# Patient Record
Sex: MALE | Race: WHITE | Employment: OTHER | URBAN - METROPOLITAN AREA
[De-identification: names, ages, dates, MRNs, and addresses within clinical notes are randomized per-mention and may not be internally consistent; named-entity substitution may affect disease eponyms.]

---

## 2018-02-22 PROBLEM — A41.9 SEPSIS (HCC): Status: ACTIVE | Noted: 2018-02-22

## 2018-02-23 PROBLEM — F44.89 CONFUSION STATE: Status: ACTIVE | Noted: 2018-02-23

## 2018-02-23 PROBLEM — J10.1 INFLUENZA A: Status: ACTIVE | Noted: 2018-02-23

## 2018-02-23 PROBLEM — E11.9 TYPE 2 DIABETES MELLITUS WITHOUT COMPLICATION (HCC): Chronic | Status: ACTIVE | Noted: 2018-02-23

## 2018-04-11 PROBLEM — J10.1 INFLUENZA A: Status: RESOLVED | Noted: 2018-02-23 | Resolved: 2018-04-11

## 2019-01-01 ENCOUNTER — HOSPITAL ENCOUNTER (OUTPATIENT)
Age: 84
Discharge: HOME | End: 2019-10-12
Payer: COMMERCIAL

## 2019-01-01 LAB
ANION GAP SERPL CALCULATED.3IONS-SCNC: 15 MMOL/L (ref 7–16)
BUN BLDV-MCNC: 16 MG/DL (ref 8–23)
CALCIUM SERPL-MCNC: 9.5 MG/DL (ref 8.6–10.2)
CHLORIDE BLD-SCNC: 107 MMOL/L (ref 98–107)
CO2: 19 MMOL/L (ref 22–29)
CREAT SERPL-MCNC: 0.9 MG/DL (ref 0.7–1.2)
GFR AFRICAN AMERICAN: >60
GFR NON-AFRICAN AMERICAN: >60 ML/MIN/1.73
GLUCOSE BLD-MCNC: 103 MG/DL (ref 74–99)
HBA1C MFR BLD: 6 % (ref 4–5.6)
POTASSIUM SERPL-SCNC: 5.1 MMOL/L (ref 3.5–5)
SODIUM BLD-SCNC: 141 MMOL/L (ref 132–146)

## 2019-01-01 PROCEDURE — 83036 HEMOGLOBIN GLYCOSYLATED A1C: CPT

## 2019-01-01 PROCEDURE — 80048 BASIC METABOLIC PNL TOTAL CA: CPT

## 2019-01-01 PROCEDURE — 36415 COLL VENOUS BLD VENIPUNCTURE: CPT

## 2020-01-01 ENCOUNTER — APPOINTMENT (OUTPATIENT)
Dept: CT IMAGING | Age: 85
DRG: 177 | End: 2020-01-01
Payer: MEDICARE

## 2020-01-01 ENCOUNTER — HOSPITAL ENCOUNTER (OUTPATIENT)
Age: 85
Discharge: HOME | End: 2020-04-20

## 2020-01-01 ENCOUNTER — HOSPITAL ENCOUNTER (OUTPATIENT)
Age: 85
Discharge: HOME | End: 2020-01-16
Payer: COMMERCIAL

## 2020-01-01 ENCOUNTER — TELEPHONE (OUTPATIENT)
Dept: FAMILY MEDICINE CLINIC | Age: 85
End: 2020-01-01

## 2020-01-01 ENCOUNTER — HOSPITAL ENCOUNTER (INPATIENT)
Age: 85
LOS: 19 days | Discharge: HOSPICE | DRG: 177 | End: 2020-04-18
Attending: EMERGENCY MEDICINE | Admitting: INTERNAL MEDICINE
Payer: MEDICARE

## 2020-01-01 VITALS
OXYGEN SATURATION: 93 % | HEIGHT: 70 IN | BODY MASS INDEX: 23.78 KG/M2 | TEMPERATURE: 98.3 F | RESPIRATION RATE: 20 BRPM | DIASTOLIC BLOOD PRESSURE: 79 MMHG | WEIGHT: 166.1 LBS | SYSTOLIC BLOOD PRESSURE: 151 MMHG | HEART RATE: 78 BPM

## 2020-01-01 LAB
ADENOVIRUS BY PCR: NOT DETECTED
ALBUMIN SERPL-MCNC: 2.6 G/DL (ref 3.5–5.2)
ALBUMIN SERPL-MCNC: 2.7 G/DL (ref 3.5–5.2)
ALBUMIN SERPL-MCNC: 2.7 G/DL (ref 3.5–5.2)
ALBUMIN SERPL-MCNC: 2.8 G/DL (ref 3.5–5.2)
ALBUMIN SERPL-MCNC: 2.9 G/DL (ref 3.5–5.2)
ALBUMIN SERPL-MCNC: 2.9 G/DL (ref 3.5–5.2)
ALBUMIN SERPL-MCNC: 3 G/DL (ref 3.5–5.2)
ALBUMIN SERPL-MCNC: 3 G/DL (ref 3.5–5.2)
ALBUMIN SERPL-MCNC: 3.1 G/DL (ref 3.5–5.2)
ALBUMIN SERPL-MCNC: 3.1 G/DL (ref 3.5–5.2)
ALBUMIN SERPL-MCNC: 3.2 G/DL (ref 3.5–5.2)
ALBUMIN SERPL-MCNC: 3.4 G/DL (ref 3.5–5.2)
ALP BLD-CCNC: 101 U/L (ref 40–129)
ALP BLD-CCNC: 106 U/L (ref 40–129)
ALP BLD-CCNC: 109 U/L (ref 40–129)
ALP BLD-CCNC: 109 U/L (ref 40–129)
ALP BLD-CCNC: 111 U/L (ref 40–129)
ALP BLD-CCNC: 113 U/L (ref 40–129)
ALP BLD-CCNC: 114 U/L (ref 40–129)
ALP BLD-CCNC: 118 U/L (ref 40–129)
ALP BLD-CCNC: 120 U/L (ref 40–129)
ALP BLD-CCNC: 131 U/L (ref 40–129)
ALP BLD-CCNC: 76 U/L (ref 40–129)
ALP BLD-CCNC: 79 U/L (ref 40–129)
ALP BLD-CCNC: 96 U/L (ref 40–129)
ALP BLD-CCNC: 98 U/L (ref 40–129)
ALP BLD-CCNC: 98 U/L (ref 40–129)
ALP BLD-CCNC: 99 U/L (ref 40–129)
ALT SERPL-CCNC: 10 U/L (ref 0–40)
ALT SERPL-CCNC: 11 U/L (ref 0–40)
ALT SERPL-CCNC: 11 U/L (ref 0–40)
ALT SERPL-CCNC: 12 U/L (ref 0–40)
ALT SERPL-CCNC: 14 U/L (ref 0–40)
ALT SERPL-CCNC: 14 U/L (ref 0–40)
ALT SERPL-CCNC: 15 U/L (ref 0–40)
ALT SERPL-CCNC: 15 U/L (ref 0–40)
ALT SERPL-CCNC: 19 U/L (ref 0–40)
ALT SERPL-CCNC: 19 U/L (ref 0–40)
ALT SERPL-CCNC: 29 U/L (ref 0–40)
ALT SERPL-CCNC: 29 U/L (ref 0–40)
ALT SERPL-CCNC: 39 U/L (ref 0–40)
ALT SERPL-CCNC: 7 U/L (ref 0–40)
ALT SERPL-CCNC: 8 U/L (ref 0–40)
ALT SERPL-CCNC: 9 U/L (ref 0–40)
ANION GAP SERPL CALCULATED.3IONS-SCNC: 10 MMOL/L (ref 7–16)
ANION GAP SERPL CALCULATED.3IONS-SCNC: 10 MMOL/L (ref 7–16)
ANION GAP SERPL CALCULATED.3IONS-SCNC: 11 MMOL/L (ref 7–16)
ANION GAP SERPL CALCULATED.3IONS-SCNC: 12 MMOL/L (ref 7–16)
ANION GAP SERPL CALCULATED.3IONS-SCNC: 13 MMOL/L (ref 7–16)
ANION GAP SERPL CALCULATED.3IONS-SCNC: 14 MMOL/L (ref 7–16)
ANION GAP SERPL CALCULATED.3IONS-SCNC: 16 MMOL/L (ref 7–16)
ANION GAP SERPL CALCULATED.3IONS-SCNC: 17 MMOL/L (ref 7–16)
ANION GAP SERPL CALCULATED.3IONS-SCNC: 17 MMOL/L (ref 7–16)
ANION GAP SERPL CALCULATED.3IONS-SCNC: 19 MMOL/L (ref 7–16)
ANION GAP SERPL CALCULATED.3IONS-SCNC: 9 MMOL/L (ref 7–16)
ANISOCYTOSIS: ABNORMAL
ANISOCYTOSIS: ABNORMAL
AST SERPL-CCNC: 109 U/L (ref 0–39)
AST SERPL-CCNC: 137 U/L (ref 0–39)
AST SERPL-CCNC: 17 U/L (ref 0–39)
AST SERPL-CCNC: 18 U/L (ref 0–39)
AST SERPL-CCNC: 18 U/L (ref 0–39)
AST SERPL-CCNC: 19 U/L (ref 0–39)
AST SERPL-CCNC: 19 U/L (ref 0–39)
AST SERPL-CCNC: 23 U/L (ref 0–39)
AST SERPL-CCNC: 24 U/L (ref 0–39)
AST SERPL-CCNC: 26 U/L (ref 0–39)
AST SERPL-CCNC: 27 U/L (ref 0–39)
AST SERPL-CCNC: 31 U/L (ref 0–39)
AST SERPL-CCNC: 35 U/L (ref 0–39)
AST SERPL-CCNC: 38 U/L (ref 0–39)
AST SERPL-CCNC: 43 U/L (ref 0–39)
AST SERPL-CCNC: 51 U/L (ref 0–39)
AST SERPL-CCNC: 67 U/L (ref 0–39)
AST SERPL-CCNC: 70 U/L (ref 0–39)
ATYPICAL LYMPHOCYTE RELATIVE PERCENT: 0.9 % (ref 0–4)
B.E.: 0.9 MMOL/L (ref -3–0)
BASOPHILS ABSOLUTE: 0 E9/L (ref 0–0.2)
BASOPHILS ABSOLUTE: 0.01 E9/L (ref 0–0.2)
BASOPHILS ABSOLUTE: 0.01 E9/L (ref 0–0.2)
BASOPHILS ABSOLUTE: 0.02 E9/L (ref 0–0.2)
BASOPHILS ABSOLUTE: 0.03 E9/L (ref 0–0.2)
BASOPHILS ABSOLUTE: 0.03 E9/L (ref 0–0.2)
BASOPHILS ABSOLUTE: 0.04 E9/L (ref 0–0.2)
BASOPHILS ABSOLUTE: 0.05 E9/L (ref 0–0.2)
BASOPHILS ABSOLUTE: 0.07 E9/L (ref 0–0.2)
BASOPHILS RELATIVE PERCENT: 0 % (ref 0–2)
BASOPHILS RELATIVE PERCENT: 0.2 % (ref 0–2)
BASOPHILS RELATIVE PERCENT: 0.3 % (ref 0–2)
BASOPHILS RELATIVE PERCENT: 0.4 % (ref 0–2)
BASOPHILS RELATIVE PERCENT: 0.5 % (ref 0–2)
BASOPHILS RELATIVE PERCENT: 0.5 % (ref 0–2)
BASOPHILS RELATIVE PERCENT: 0.6 % (ref 0–2)
BILIRUB SERPL-MCNC: 0.5 MG/DL (ref 0–1.2)
BILIRUB SERPL-MCNC: 0.6 MG/DL (ref 0–1.2)
BILIRUB SERPL-MCNC: 0.7 MG/DL (ref 0–1.2)
BILIRUB SERPL-MCNC: 0.7 MG/DL (ref 0–1.2)
BLOOD CULTURE, ROUTINE: NORMAL
BORDETELLA PARAPERTUSSIS BY PCR: NOT DETECTED
BORDETELLA PERTUSSIS BY PCR: NOT DETECTED
BUN BLDV-MCNC: 11 MG/DL (ref 8–23)
BUN BLDV-MCNC: 13 MG/DL (ref 8–23)
BUN BLDV-MCNC: 13 MG/DL (ref 8–23)
BUN BLDV-MCNC: 18 MG/DL (ref 8–23)
BUN BLDV-MCNC: 22 MG/DL (ref 8–23)
BUN BLDV-MCNC: 22 MG/DL (ref 8–23)
BUN BLDV-MCNC: 23 MG/DL (ref 8–23)
BUN BLDV-MCNC: 24 MG/DL (ref 8–23)
BUN BLDV-MCNC: 24 MG/DL (ref 8–23)
BUN BLDV-MCNC: 25 MG/DL (ref 8–23)
BUN BLDV-MCNC: 26 MG/DL (ref 8–23)
BUN BLDV-MCNC: 27 MG/DL (ref 8–23)
BUN BLDV-MCNC: 27 MG/DL (ref 8–23)
BUN BLDV-MCNC: 28 MG/DL (ref 8–23)
BUN BLDV-MCNC: 31 MG/DL (ref 8–23)
BUN BLDV-MCNC: 32 MG/DL (ref 8–23)
BUN BLDV-MCNC: 32 MG/DL (ref 8–23)
BUN BLDV-MCNC: 33 MG/DL (ref 8–23)
BUN BLDV-MCNC: 34 MG/DL (ref 8–23)
BUN BLDV-MCNC: 35 MG/DL (ref 8–23)
BURR CELLS: ABNORMAL
C-REACTIVE PROTEIN: 10.2 MG/DL (ref 0–0.4)
CALCIUM SERPL-MCNC: 8.4 MG/DL (ref 8.6–10.2)
CALCIUM SERPL-MCNC: 8.4 MG/DL (ref 8.6–10.2)
CALCIUM SERPL-MCNC: 8.5 MG/DL (ref 8.6–10.2)
CALCIUM SERPL-MCNC: 8.6 MG/DL (ref 8.6–10.2)
CALCIUM SERPL-MCNC: 8.7 MG/DL (ref 8.6–10.2)
CALCIUM SERPL-MCNC: 8.7 MG/DL (ref 8.6–10.2)
CALCIUM SERPL-MCNC: 8.8 MG/DL (ref 8.6–10.2)
CALCIUM SERPL-MCNC: 8.9 MG/DL (ref 8.6–10.2)
CALCIUM SERPL-MCNC: 9 MG/DL (ref 8.6–10.2)
CALCIUM SERPL-MCNC: 9 MG/DL (ref 8.6–10.2)
CALCIUM SERPL-MCNC: 9.2 MG/DL (ref 8.6–10.2)
CALCIUM SERPL-MCNC: 9.2 MG/DL (ref 8.6–10.2)
CALCIUM SERPL-MCNC: 9.3 MG/DL (ref 8.6–10.2)
CHLAMYDOPHILIA PNEUMONIAE BY PCR: NOT DETECTED
CHLORIDE BLD-SCNC: 101 MMOL/L (ref 98–107)
CHLORIDE BLD-SCNC: 104 MMOL/L (ref 98–107)
CHLORIDE BLD-SCNC: 104 MMOL/L (ref 98–107)
CHLORIDE BLD-SCNC: 105 MMOL/L (ref 98–107)
CHLORIDE BLD-SCNC: 105 MMOL/L (ref 98–107)
CHLORIDE BLD-SCNC: 106 MMOL/L (ref 98–107)
CHLORIDE BLD-SCNC: 107 MMOL/L (ref 98–107)
CHLORIDE BLD-SCNC: 108 MMOL/L (ref 98–107)
CHLORIDE BLD-SCNC: 108 MMOL/L (ref 98–107)
CHLORIDE BLD-SCNC: 109 MMOL/L (ref 98–107)
CHLORIDE BLD-SCNC: 112 MMOL/L (ref 98–107)
CHLORIDE BLD-SCNC: 114 MMOL/L (ref 98–107)
CHLORIDE BLD-SCNC: 114 MMOL/L (ref 98–107)
CHLORIDE BLD-SCNC: 116 MMOL/L (ref 98–107)
CHLORIDE BLD-SCNC: 116 MMOL/L (ref 98–107)
CHLORIDE BLD-SCNC: 118 MMOL/L (ref 98–107)
CHLORIDE BLD-SCNC: 118 MMOL/L (ref 98–107)
CHLORIDE BLD-SCNC: 119 MMOL/L (ref 98–107)
CHLORIDE BLD-SCNC: 119 MMOL/L (ref 98–107)
CHLORIDE URINE RANDOM: 23 MMOL/L
CHOLESTEROL, TOTAL: 105 MG/DL (ref 0–199)
CO2: 20 MMOL/L (ref 22–29)
CO2: 21 MMOL/L (ref 22–29)
CO2: 22 MMOL/L (ref 22–29)
CO2: 23 MMOL/L (ref 22–29)
CO2: 24 MMOL/L (ref 22–29)
CO2: 24 MMOL/L (ref 22–29)
CO2: 25 MMOL/L (ref 22–29)
CO2: 26 MMOL/L (ref 22–29)
CORONAVIRUS 229E BY PCR: NOT DETECTED
CORONAVIRUS HKU1 BY PCR: NOT DETECTED
CORONAVIRUS NL63 BY PCR: NOT DETECTED
CORONAVIRUS OC43 BY PCR: NOT DETECTED
CREAT SERPL-MCNC: 0.8 MG/DL (ref 0.7–1.2)
CREAT SERPL-MCNC: 0.9 MG/DL (ref 0.7–1.2)
CREAT SERPL-MCNC: 1 MG/DL (ref 0.7–1.2)
CREATININE URINE: 166 MG/DL (ref 40–278)
CULTURE, BLOOD 2: NORMAL
DEVICE: ABNORMAL
EKG ATRIAL RATE: 78 BPM
EKG P AXIS: 44 DEGREES
EKG P-R INTERVAL: 174 MS
EKG Q-T INTERVAL: 372 MS
EKG QRS DURATION: 80 MS
EKG QTC CALCULATION (BAZETT): 424 MS
EKG R AXIS: 43 DEGREES
EKG T AXIS: 52 DEGREES
EKG VENTRICULAR RATE: 78 BPM
EOSINOPHILS ABSOLUTE: 0 E9/L (ref 0.05–0.5)
EOSINOPHILS ABSOLUTE: 0.02 E9/L (ref 0.05–0.5)
EOSINOPHILS ABSOLUTE: 0.03 E9/L (ref 0.05–0.5)
EOSINOPHILS ABSOLUTE: 0.03 E9/L (ref 0.05–0.5)
EOSINOPHILS ABSOLUTE: 0.04 E9/L (ref 0.05–0.5)
EOSINOPHILS ABSOLUTE: 0.04 E9/L (ref 0.05–0.5)
EOSINOPHILS ABSOLUTE: 0.05 E9/L (ref 0.05–0.5)
EOSINOPHILS ABSOLUTE: 0.06 E9/L (ref 0.05–0.5)
EOSINOPHILS ABSOLUTE: 0.07 E9/L (ref 0.05–0.5)
EOSINOPHILS ABSOLUTE: 0.11 E9/L (ref 0.05–0.5)
EOSINOPHILS RELATIVE PERCENT: 0 % (ref 0–6)
EOSINOPHILS RELATIVE PERCENT: 0.3 % (ref 0–6)
EOSINOPHILS RELATIVE PERCENT: 0.4 % (ref 0–6)
EOSINOPHILS RELATIVE PERCENT: 0.4 % (ref 0–6)
EOSINOPHILS RELATIVE PERCENT: 0.5 % (ref 0–6)
EOSINOPHILS RELATIVE PERCENT: 0.5 % (ref 0–6)
EOSINOPHILS RELATIVE PERCENT: 0.6 % (ref 0–6)
EOSINOPHILS RELATIVE PERCENT: 0.7 % (ref 0–6)
EOSINOPHILS RELATIVE PERCENT: 0.8 % (ref 0–6)
EOSINOPHILS RELATIVE PERCENT: 0.9 % (ref 0–6)
EOSINOPHILS RELATIVE PERCENT: 1.2 % (ref 0–6)
GFR AFRICAN AMERICAN: >60
GFR NON-AFRICAN AMERICAN: >60 ML/MIN/1.73
GLUCOSE BLD-MCNC: 116 MG/DL (ref 74–99)
GLUCOSE BLD-MCNC: 119 MG/DL (ref 74–99)
GLUCOSE BLD-MCNC: 125 MG/DL (ref 74–99)
GLUCOSE BLD-MCNC: 126 MG/DL (ref 74–99)
GLUCOSE BLD-MCNC: 127 MG/DL (ref 74–99)
GLUCOSE BLD-MCNC: 127 MG/DL (ref 74–99)
GLUCOSE BLD-MCNC: 129 MG/DL (ref 74–99)
GLUCOSE BLD-MCNC: 129 MG/DL (ref 74–99)
GLUCOSE BLD-MCNC: 131 MG/DL (ref 74–99)
GLUCOSE BLD-MCNC: 133 MG/DL (ref 74–99)
GLUCOSE BLD-MCNC: 136 MG/DL (ref 74–99)
GLUCOSE BLD-MCNC: 142 MG/DL (ref 74–99)
GLUCOSE BLD-MCNC: 156 MG/DL (ref 74–99)
GLUCOSE BLD-MCNC: 158 MG/DL (ref 74–99)
GLUCOSE BLD-MCNC: 166 MG/DL (ref 74–99)
GLUCOSE BLD-MCNC: 194 MG/DL (ref 74–99)
GLUCOSE BLD-MCNC: 212 MG/DL (ref 74–99)
GLUCOSE BLD-MCNC: 229 MG/DL (ref 74–99)
GLUCOSE BLD-MCNC: 80 MG/DL (ref 74–99)
GLUCOSE BLD-MCNC: 81 MG/DL (ref 74–99)
GLUCOSE BLD-MCNC: 84 MG/DL (ref 74–99)
GLUCOSE BLD-MCNC: 85 MG/DL (ref 74–99)
GLUCOSE BLD-MCNC: 88 MG/DL (ref 74–99)
GLUCOSE BLD-MCNC: 91 MG/DL (ref 74–99)
GLUCOSE BLD-MCNC: 98 MG/DL (ref 74–99)
HBA1C MFR BLD: 6.1 % (ref 4–5.6)
HCO3 ARTERIAL: 24.4 MMOL/L (ref 22–26)
HCT VFR BLD CALC: 33.5 % (ref 37–54)
HCT VFR BLD CALC: 34.6 % (ref 37–54)
HCT VFR BLD CALC: 34.7 % (ref 37–54)
HCT VFR BLD CALC: 35.4 % (ref 37–54)
HCT VFR BLD CALC: 35.5 % (ref 37–54)
HCT VFR BLD CALC: 35.6 % (ref 37–54)
HCT VFR BLD CALC: 35.8 % (ref 37–54)
HCT VFR BLD CALC: 36.3 % (ref 37–54)
HCT VFR BLD CALC: 36.5 % (ref 37–54)
HCT VFR BLD CALC: 36.5 % (ref 37–54)
HCT VFR BLD CALC: 38.3 % (ref 37–54)
HCT VFR BLD CALC: 38.4 % (ref 37–54)
HCT VFR BLD CALC: 40.2 % (ref 37–54)
HCT VFR BLD CALC: 41 % (ref 37–54)
HCT VFR BLD CALC: 42 % (ref 37–54)
HCT VFR BLD CALC: 42.4 % (ref 37–54)
HCT VFR BLD CALC: 42.7 % (ref 37–54)
HCT VFR BLD CALC: 42.9 % (ref 37–54)
HCT VFR BLD CALC: 43.4 % (ref 37–54)
HDLC SERPL-MCNC: 43 MG/DL
HEMOGLOBIN: 10.7 G/DL (ref 12.5–16.5)
HEMOGLOBIN: 11.2 G/DL (ref 12.5–16.5)
HEMOGLOBIN: 11.4 G/DL (ref 12.5–16.5)
HEMOGLOBIN: 11.6 G/DL (ref 12.5–16.5)
HEMOGLOBIN: 11.7 G/DL (ref 12.5–16.5)
HEMOGLOBIN: 11.8 G/DL (ref 12.5–16.5)
HEMOGLOBIN: 11.9 G/DL (ref 12.5–16.5)
HEMOGLOBIN: 11.9 G/DL (ref 12.5–16.5)
HEMOGLOBIN: 12.3 G/DL (ref 12.5–16.5)
HEMOGLOBIN: 12.6 G/DL (ref 12.5–16.5)
HEMOGLOBIN: 13.1 G/DL (ref 12.5–16.5)
HEMOGLOBIN: 13.3 G/DL (ref 12.5–16.5)
HEMOGLOBIN: 13.5 G/DL (ref 12.5–16.5)
HEMOGLOBIN: 13.6 G/DL (ref 12.5–16.5)
HEMOGLOBIN: 13.7 G/DL (ref 12.5–16.5)
HEMOGLOBIN: 13.9 G/DL (ref 12.5–16.5)
HEMOGLOBIN: 14 G/DL (ref 12.5–16.5)
HUMAN METAPNEUMOVIRUS BY PCR: NOT DETECTED
HUMAN RHINOVIRUS/ENTEROVIRUS BY PCR: NOT DETECTED
IMMATURE GRANULOCYTES #: 0.03 E9/L
IMMATURE GRANULOCYTES #: 0.08 E9/L
IMMATURE GRANULOCYTES #: 0.09 E9/L
IMMATURE GRANULOCYTES #: 0.14 E9/L
IMMATURE GRANULOCYTES #: 0.15 E9/L
IMMATURE GRANULOCYTES #: 0.18 E9/L
IMMATURE GRANULOCYTES #: 0.19 E9/L
IMMATURE GRANULOCYTES #: 0.21 E9/L
IMMATURE GRANULOCYTES #: 0.31 E9/L
IMMATURE GRANULOCYTES #: 0.39 E9/L
IMMATURE GRANULOCYTES #: 0.4 E9/L
IMMATURE GRANULOCYTES #: 0.42 E9/L
IMMATURE GRANULOCYTES #: 0.45 E9/L
IMMATURE GRANULOCYTES #: 0.51 E9/L
IMMATURE GRANULOCYTES %: 0.5 % (ref 0–5)
IMMATURE GRANULOCYTES %: 0.9 % (ref 0–5)
IMMATURE GRANULOCYTES %: 1.8 % (ref 0–5)
IMMATURE GRANULOCYTES %: 1.8 % (ref 0–5)
IMMATURE GRANULOCYTES %: 1.9 % (ref 0–5)
IMMATURE GRANULOCYTES %: 2 % (ref 0–5)
IMMATURE GRANULOCYTES %: 2.4 % (ref 0–5)
IMMATURE GRANULOCYTES %: 2.4 % (ref 0–5)
IMMATURE GRANULOCYTES %: 3.9 % (ref 0–5)
IMMATURE GRANULOCYTES %: 4 % (ref 0–5)
IMMATURE GRANULOCYTES %: 4.3 % (ref 0–5)
IMMATURE GRANULOCYTES %: 4.4 % (ref 0–5)
IMMATURE GRANULOCYTES %: 4.4 % (ref 0–5)
IMMATURE GRANULOCYTES %: 4.9 % (ref 0–5)
INFLUENZA A BY PCR: NOT DETECTED
INFLUENZA A BY PCR: NOT DETECTED
INFLUENZA B BY PCR: NOT DETECTED
INFLUENZA B BY PCR: NOT DETECTED
LACTIC ACID: 2 MMOL/L (ref 0.5–2.2)
LDL CHOLESTEROL CALCULATED: 46 MG/DL (ref 0–99)
LYMPHOCYTES ABSOLUTE: 0.23 E9/L (ref 1.5–4)
LYMPHOCYTES ABSOLUTE: 0.24 E9/L (ref 1.5–4)
LYMPHOCYTES ABSOLUTE: 0.47 E9/L (ref 1.5–4)
LYMPHOCYTES ABSOLUTE: 0.64 E9/L (ref 1.5–4)
LYMPHOCYTES ABSOLUTE: 0.92 E9/L (ref 1.5–4)
LYMPHOCYTES ABSOLUTE: 0.95 E9/L (ref 1.5–4)
LYMPHOCYTES ABSOLUTE: 1.03 E9/L (ref 1.5–4)
LYMPHOCYTES ABSOLUTE: 1.15 E9/L (ref 1.5–4)
LYMPHOCYTES ABSOLUTE: 1.19 E9/L (ref 1.5–4)
LYMPHOCYTES ABSOLUTE: 1.3 E9/L (ref 1.5–4)
LYMPHOCYTES ABSOLUTE: 1.65 E9/L (ref 1.5–4)
LYMPHOCYTES ABSOLUTE: 1.66 E9/L (ref 1.5–4)
LYMPHOCYTES ABSOLUTE: 1.67 E9/L (ref 1.5–4)
LYMPHOCYTES ABSOLUTE: 2.03 E9/L (ref 1.5–4)
LYMPHOCYTES ABSOLUTE: 2.05 E9/L (ref 1.5–4)
LYMPHOCYTES ABSOLUTE: 2.06 E9/L (ref 1.5–4)
LYMPHOCYTES ABSOLUTE: 2.15 E9/L (ref 1.5–4)
LYMPHOCYTES ABSOLUTE: 2.18 E9/L (ref 1.5–4)
LYMPHOCYTES RELATIVE PERCENT: 1.7 % (ref 20–42)
LYMPHOCYTES RELATIVE PERCENT: 11 % (ref 20–42)
LYMPHOCYTES RELATIVE PERCENT: 12.3 % (ref 20–42)
LYMPHOCYTES RELATIVE PERCENT: 12.9 % (ref 20–42)
LYMPHOCYTES RELATIVE PERCENT: 13.2 % (ref 20–42)
LYMPHOCYTES RELATIVE PERCENT: 13.9 % (ref 20–42)
LYMPHOCYTES RELATIVE PERCENT: 15.5 % (ref 20–42)
LYMPHOCYTES RELATIVE PERCENT: 15.8 % (ref 20–42)
LYMPHOCYTES RELATIVE PERCENT: 16.4 % (ref 20–42)
LYMPHOCYTES RELATIVE PERCENT: 17.5 % (ref 20–42)
LYMPHOCYTES RELATIVE PERCENT: 21.6 % (ref 20–42)
LYMPHOCYTES RELATIVE PERCENT: 21.7 % (ref 20–42)
LYMPHOCYTES RELATIVE PERCENT: 22.7 % (ref 20–42)
LYMPHOCYTES RELATIVE PERCENT: 24.1 % (ref 20–42)
LYMPHOCYTES RELATIVE PERCENT: 27.5 % (ref 20–42)
LYMPHOCYTES RELATIVE PERCENT: 27.7 % (ref 20–42)
LYMPHOCYTES RELATIVE PERCENT: 3.5 % (ref 20–42)
LYMPHOCYTES RELATIVE PERCENT: 6 % (ref 20–42)
MAGNESIUM: 1.7 MG/DL (ref 1.6–2.6)
MAGNESIUM: 1.8 MG/DL (ref 1.6–2.6)
MAGNESIUM: 1.9 MG/DL (ref 1.6–2.6)
MAGNESIUM: 2 MG/DL (ref 1.6–2.6)
MAGNESIUM: 2.1 MG/DL (ref 1.6–2.6)
MAGNESIUM: 2.2 MG/DL (ref 1.6–2.6)
MCH RBC QN AUTO: 29.6 PG (ref 26–35)
MCH RBC QN AUTO: 29.7 PG (ref 26–35)
MCH RBC QN AUTO: 29.8 PG (ref 26–35)
MCH RBC QN AUTO: 30 PG (ref 26–35)
MCH RBC QN AUTO: 30.1 PG (ref 26–35)
MCH RBC QN AUTO: 30.2 PG (ref 26–35)
MCH RBC QN AUTO: 30.2 PG (ref 26–35)
MCH RBC QN AUTO: 30.3 PG (ref 26–35)
MCH RBC QN AUTO: 30.6 PG (ref 26–35)
MCH RBC QN AUTO: 31 PG (ref 26–35)
MCHC RBC AUTO-ENTMCNC: 31.5 % (ref 32–34.5)
MCHC RBC AUTO-ENTMCNC: 31.8 % (ref 32–34.5)
MCHC RBC AUTO-ENTMCNC: 31.9 % (ref 32–34.5)
MCHC RBC AUTO-ENTMCNC: 31.9 % (ref 32–34.5)
MCHC RBC AUTO-ENTMCNC: 32 % (ref 32–34.5)
MCHC RBC AUTO-ENTMCNC: 32.1 % (ref 32–34.5)
MCHC RBC AUTO-ENTMCNC: 32.1 % (ref 32–34.5)
MCHC RBC AUTO-ENTMCNC: 32.3 % (ref 32–34.5)
MCHC RBC AUTO-ENTMCNC: 32.4 % (ref 32–34.5)
MCHC RBC AUTO-ENTMCNC: 32.6 % (ref 32–34.5)
MCHC RBC AUTO-ENTMCNC: 32.8 % (ref 32–34.5)
MCHC RBC AUTO-ENTMCNC: 32.9 % (ref 32–34.5)
MCHC RBC AUTO-ENTMCNC: 33.3 % (ref 32–34.5)
MCV RBC AUTO: 91.3 FL (ref 80–99.9)
MCV RBC AUTO: 91.7 FL (ref 80–99.9)
MCV RBC AUTO: 92 FL (ref 80–99.9)
MCV RBC AUTO: 92.1 FL (ref 80–99.9)
MCV RBC AUTO: 92.1 FL (ref 80–99.9)
MCV RBC AUTO: 92.2 FL (ref 80–99.9)
MCV RBC AUTO: 92.4 FL (ref 80–99.9)
MCV RBC AUTO: 92.6 FL (ref 80–99.9)
MCV RBC AUTO: 92.7 FL (ref 80–99.9)
MCV RBC AUTO: 92.8 FL (ref 80–99.9)
MCV RBC AUTO: 92.8 FL (ref 80–99.9)
MCV RBC AUTO: 93 FL (ref 80–99.9)
MCV RBC AUTO: 93.3 FL (ref 80–99.9)
MCV RBC AUTO: 93.8 FL (ref 80–99.9)
MCV RBC AUTO: 94.1 FL (ref 80–99.9)
MCV RBC AUTO: 94.1 FL (ref 80–99.9)
MCV RBC AUTO: 94.2 FL (ref 80–99.9)
MCV RBC AUTO: 94.4 FL (ref 80–99.9)
MCV RBC AUTO: 95.1 FL (ref 80–99.9)
METAMYELOCYTES RELATIVE PERCENT: 0.9 % (ref 0–1)
METAMYELOCYTES RELATIVE PERCENT: 1 % (ref 0–1)
METAMYELOCYTES RELATIVE PERCENT: 1 % (ref 0–1)
METER GLUCOSE: 105 MG/DL (ref 74–99)
METER GLUCOSE: 108 MG/DL (ref 74–99)
METER GLUCOSE: 114 MG/DL (ref 74–99)
METER GLUCOSE: 114 MG/DL (ref 74–99)
METER GLUCOSE: 128 MG/DL (ref 74–99)
METER GLUCOSE: 81 MG/DL (ref 74–99)
METER GLUCOSE: 94 MG/DL (ref 74–99)
METER GLUCOSE: 95 MG/DL (ref 74–99)
MICROALBUMIN UR-MCNC: <12 MG/L
MICROALBUMIN/CREAT UR-RTO: ABNORMAL (ref 0–30)
MODE: ABNORMAL
MONOCYTES ABSOLUTE: 0.08 E9/L (ref 0.1–0.95)
MONOCYTES ABSOLUTE: 0.29 E9/L (ref 0.1–0.95)
MONOCYTES ABSOLUTE: 0.31 E9/L (ref 0.1–0.95)
MONOCYTES ABSOLUTE: 0.37 E9/L (ref 0.1–0.95)
MONOCYTES ABSOLUTE: 0.4 E9/L (ref 0.1–0.95)
MONOCYTES ABSOLUTE: 0.46 E9/L (ref 0.1–0.95)
MONOCYTES ABSOLUTE: 0.52 E9/L (ref 0.1–0.95)
MONOCYTES ABSOLUTE: 0.54 E9/L (ref 0.1–0.95)
MONOCYTES ABSOLUTE: 0.54 E9/L (ref 0.1–0.95)
MONOCYTES ABSOLUTE: 0.55 E9/L (ref 0.1–0.95)
MONOCYTES ABSOLUTE: 0.55 E9/L (ref 0.1–0.95)
MONOCYTES ABSOLUTE: 0.61 E9/L (ref 0.1–0.95)
MONOCYTES ABSOLUTE: 0.62 E9/L (ref 0.1–0.95)
MONOCYTES ABSOLUTE: 0.66 E9/L (ref 0.1–0.95)
MONOCYTES ABSOLUTE: 0.66 E9/L (ref 0.1–0.95)
MONOCYTES ABSOLUTE: 0.7 E9/L (ref 0.1–0.95)
MONOCYTES ABSOLUTE: 0.7 E9/L (ref 0.1–0.95)
MONOCYTES ABSOLUTE: 0.92 E9/L (ref 0.1–0.95)
MONOCYTES RELATIVE PERCENT: 1 % (ref 2–12)
MONOCYTES RELATIVE PERCENT: 4.7 % (ref 2–12)
MONOCYTES RELATIVE PERCENT: 5.2 % (ref 2–12)
MONOCYTES RELATIVE PERCENT: 5.4 % (ref 2–12)
MONOCYTES RELATIVE PERCENT: 5.5 % (ref 2–12)
MONOCYTES RELATIVE PERCENT: 5.8 % (ref 2–12)
MONOCYTES RELATIVE PERCENT: 6.4 % (ref 2–12)
MONOCYTES RELATIVE PERCENT: 6.5 % (ref 2–12)
MONOCYTES RELATIVE PERCENT: 6.9 % (ref 2–12)
MONOCYTES RELATIVE PERCENT: 7 % (ref 2–12)
MONOCYTES RELATIVE PERCENT: 7.3 % (ref 2–12)
MONOCYTES RELATIVE PERCENT: 7.4 % (ref 2–12)
MONOCYTES RELATIVE PERCENT: 7.8 % (ref 2–12)
MONOCYTES RELATIVE PERCENT: 8 % (ref 2–12)
MONOCYTES RELATIVE PERCENT: 8.7 % (ref 2–12)
MONOCYTES RELATIVE PERCENT: 8.7 % (ref 2–12)
MYCOPLASMA PNEUMONIAE BY PCR: NOT DETECTED
MYELOCYTE PERCENT: 0.9 % (ref 0–0)
MYELOCYTE PERCENT: 0.9 % (ref 0–0)
MYELOCYTE PERCENT: 4 % (ref 0–0)
NEUTROPHILS ABSOLUTE: 2 E9/L (ref 1.8–7.3)
NEUTROPHILS ABSOLUTE: 3.1 E9/L (ref 1.8–7.3)
NEUTROPHILS ABSOLUTE: 4.7 E9/L (ref 1.8–7.3)
NEUTROPHILS ABSOLUTE: 4.71 E9/L (ref 1.8–7.3)
NEUTROPHILS ABSOLUTE: 4.87 E9/L (ref 1.8–7.3)
NEUTROPHILS ABSOLUTE: 5.37 E9/L (ref 1.8–7.3)
NEUTROPHILS ABSOLUTE: 5.63 E9/L (ref 1.8–7.3)
NEUTROPHILS ABSOLUTE: 6.41 E9/L (ref 1.8–7.3)
NEUTROPHILS ABSOLUTE: 6.45 E9/L (ref 1.8–7.3)
NEUTROPHILS ABSOLUTE: 6.62 E9/L (ref 1.8–7.3)
NEUTROPHILS ABSOLUTE: 6.77 E9/L (ref 1.8–7.3)
NEUTROPHILS ABSOLUTE: 6.83 E9/L (ref 1.8–7.3)
NEUTROPHILS ABSOLUTE: 7.17 E9/L (ref 1.8–7.3)
NEUTROPHILS ABSOLUTE: 7.25 E9/L (ref 1.8–7.3)
NEUTROPHILS ABSOLUTE: 7.3 E9/L (ref 1.8–7.3)
NEUTROPHILS ABSOLUTE: 7.56 E9/L (ref 1.8–7.3)
NEUTROPHILS ABSOLUTE: 9.72 E9/L (ref 1.8–7.3)
NEUTROPHILS ABSOLUTE: 9.96 E9/L (ref 1.8–7.3)
NEUTROPHILS RELATIVE PERCENT: 60.3 % (ref 43–80)
NEUTROPHILS RELATIVE PERCENT: 62.4 % (ref 43–80)
NEUTROPHILS RELATIVE PERCENT: 65.2 % (ref 43–80)
NEUTROPHILS RELATIVE PERCENT: 65.7 % (ref 43–80)
NEUTROPHILS RELATIVE PERCENT: 67.8 % (ref 43–80)
NEUTROPHILS RELATIVE PERCENT: 69 % (ref 43–80)
NEUTROPHILS RELATIVE PERCENT: 72 % (ref 43–80)
NEUTROPHILS RELATIVE PERCENT: 72 % (ref 43–80)
NEUTROPHILS RELATIVE PERCENT: 74.3 % (ref 43–80)
NEUTROPHILS RELATIVE PERCENT: 75.1 % (ref 43–80)
NEUTROPHILS RELATIVE PERCENT: 76 % (ref 43–80)
NEUTROPHILS RELATIVE PERCENT: 76.5 % (ref 43–80)
NEUTROPHILS RELATIVE PERCENT: 76.8 % (ref 43–80)
NEUTROPHILS RELATIVE PERCENT: 76.9 % (ref 43–80)
NEUTROPHILS RELATIVE PERCENT: 78.3 % (ref 43–80)
NEUTROPHILS RELATIVE PERCENT: 86.9 % (ref 43–80)
NEUTROPHILS RELATIVE PERCENT: 88.7 % (ref 43–80)
NEUTROPHILS RELATIVE PERCENT: 92 % (ref 43–80)
NUCLEATED RED BLOOD CELLS: 0 /100 WBC
NUCLEATED RED BLOOD CELLS: 0.9 /100 WBC
O2 SATURATION: 98.8 % (ref 92–98.5)
OPERATOR ID: 1023
OSMOLALITY URINE: 910 MOSM/KG (ref 300–900)
OVALOCYTES: ABNORMAL
PARAINFLUENZA VIRUS 1 BY PCR: NOT DETECTED
PARAINFLUENZA VIRUS 2 BY PCR: NOT DETECTED
PARAINFLUENZA VIRUS 3 BY PCR: NOT DETECTED
PARAINFLUENZA VIRUS 4 BY PCR: NOT DETECTED
PATIENT TEMP: 37
PCO2 (TEMP CORRECTED): 34.1 MMHG (ref 35–45)
PDW BLD-RTO: 14.2 FL (ref 11.5–15)
PDW BLD-RTO: 14.3 FL (ref 11.5–15)
PDW BLD-RTO: 14.4 FL (ref 11.5–15)
PDW BLD-RTO: 14.4 FL (ref 11.5–15)
PDW BLD-RTO: 14.5 FL (ref 11.5–15)
PDW BLD-RTO: 14.6 FL (ref 11.5–15)
PDW BLD-RTO: 14.7 FL (ref 11.5–15)
PDW BLD-RTO: 14.7 FL (ref 11.5–15)
PDW BLD-RTO: 14.8 FL (ref 11.5–15)
PH (TEMPERATURE CORRECTED): 7.46 (ref 7.35–7.45)
PHOSPHORUS: 2 MG/DL (ref 2.5–4.5)
PHOSPHORUS: 2.3 MG/DL (ref 2.5–4.5)
PHOSPHORUS: 2.3 MG/DL (ref 2.5–4.5)
PHOSPHORUS: 2.8 MG/DL (ref 2.5–4.5)
PHOSPHORUS: 3 MG/DL (ref 2.5–4.5)
PHOSPHORUS: 3 MG/DL (ref 2.5–4.5)
PHOSPHORUS: 3.1 MG/DL (ref 2.5–4.5)
PLATELET # BLD: 138 E9/L (ref 130–450)
PLATELET # BLD: 150 E9/L (ref 130–450)
PLATELET # BLD: 192 E9/L (ref 130–450)
PLATELET # BLD: 194 E9/L (ref 130–450)
PLATELET # BLD: 201 E9/L (ref 130–450)
PLATELET # BLD: 229 E9/L (ref 130–450)
PLATELET # BLD: 240 E9/L (ref 130–450)
PLATELET # BLD: 244 E9/L (ref 130–450)
PLATELET # BLD: 249 E9/L (ref 130–450)
PLATELET # BLD: 296 E9/L (ref 130–450)
PLATELET # BLD: 297 E9/L (ref 130–450)
PLATELET # BLD: 310 E9/L (ref 130–450)
PLATELET # BLD: 324 E9/L (ref 130–450)
PLATELET # BLD: 337 E9/L (ref 130–450)
PLATELET # BLD: 341 E9/L (ref 130–450)
PLATELET # BLD: 344 E9/L (ref 130–450)
PLATELET # BLD: 349 E9/L (ref 130–450)
PLATELET # BLD: 352 E9/L (ref 130–450)
PLATELET # BLD: 390 E9/L (ref 130–450)
PMV BLD AUTO: 10.1 FL (ref 7–12)
PMV BLD AUTO: 10.1 FL (ref 7–12)
PMV BLD AUTO: 10.2 FL (ref 7–12)
PMV BLD AUTO: 10.3 FL (ref 7–12)
PMV BLD AUTO: 10.4 FL (ref 7–12)
PMV BLD AUTO: 10.4 FL (ref 7–12)
PMV BLD AUTO: 10.8 FL (ref 7–12)
PMV BLD AUTO: 10.8 FL (ref 7–12)
PMV BLD AUTO: 11.2 FL (ref 7–12)
PMV BLD AUTO: 9.6 FL (ref 7–12)
PMV BLD AUTO: 9.7 FL (ref 7–12)
PMV BLD AUTO: 9.7 FL (ref 7–12)
PMV BLD AUTO: 9.8 FL (ref 7–12)
PMV BLD AUTO: 9.8 FL (ref 7–12)
PMV BLD AUTO: 9.9 FL (ref 7–12)
PO2 (TEMP CORRECTED): 115.1 MMHG (ref 60–80)
POIKILOCYTES: ABNORMAL
POLYCHROMASIA: ABNORMAL
POTASSIUM REFLEX MAGNESIUM: 3 MMOL/L (ref 3.5–5)
POTASSIUM REFLEX MAGNESIUM: 3.2 MMOL/L (ref 3.5–5)
POTASSIUM REFLEX MAGNESIUM: 3.5 MMOL/L (ref 3.5–5)
POTASSIUM REFLEX MAGNESIUM: 3.6 MMOL/L (ref 3.5–5)
POTASSIUM REFLEX MAGNESIUM: 3.7 MMOL/L (ref 3.5–5)
POTASSIUM REFLEX MAGNESIUM: 3.8 MMOL/L (ref 3.5–5)
POTASSIUM REFLEX MAGNESIUM: 3.9 MMOL/L (ref 3.5–5)
POTASSIUM REFLEX MAGNESIUM: 3.9 MMOL/L (ref 3.5–5)
POTASSIUM REFLEX MAGNESIUM: 4 MMOL/L (ref 3.5–5)
POTASSIUM REFLEX MAGNESIUM: 4.1 MMOL/L (ref 3.5–5)
POTASSIUM REFLEX MAGNESIUM: 4.2 MMOL/L (ref 3.5–5)
POTASSIUM REFLEX MAGNESIUM: 4.3 MMOL/L (ref 3.5–5)
POTASSIUM REFLEX MAGNESIUM: 4.7 MMOL/L (ref 3.5–5)
POTASSIUM SERPL-SCNC: 3.7 MMOL/L (ref 3.5–5)
POTASSIUM SERPL-SCNC: 3.9 MMOL/L (ref 3.5–5)
POTASSIUM SERPL-SCNC: 4.1 MMOL/L (ref 3.5–5)
POTASSIUM SERPL-SCNC: 4.1 MMOL/L (ref 3.5–5)
POTASSIUM SERPL-SCNC: 4.4 MMOL/L (ref 3.5–5)
POTASSIUM SERPL-SCNC: 4.6 MMOL/L (ref 3.5–5)
POTASSIUM SERPL-SCNC: 4.7 MMOL/L (ref 3.5–5)
PROCALCITONIN: 0.15 NG/ML (ref 0–0.08)
RBC # BLD: 3.57 E12/L (ref 3.8–5.8)
RBC # BLD: 3.72 E12/L (ref 3.8–5.8)
RBC # BLD: 3.76 E12/L (ref 3.8–5.8)
RBC # BLD: 3.77 E12/L (ref 3.8–5.8)
RBC # BLD: 3.8 E12/L (ref 3.8–5.8)
RBC # BLD: 3.84 E12/L (ref 3.8–5.8)
RBC # BLD: 3.86 E12/L (ref 3.8–5.8)
RBC # BLD: 3.86 E12/L (ref 3.8–5.8)
RBC # BLD: 3.92 E12/L (ref 3.8–5.8)
RBC # BLD: 4 E12/L (ref 3.8–5.8)
RBC # BLD: 4.07 E12/L (ref 3.8–5.8)
RBC # BLD: 4.17 E12/L (ref 3.8–5.8)
RBC # BLD: 4.35 E12/L (ref 3.8–5.8)
RBC # BLD: 4.42 E12/L (ref 3.8–5.8)
RBC # BLD: 4.53 E12/L (ref 3.8–5.8)
RBC # BLD: 4.54 E12/L (ref 3.8–5.8)
RBC # BLD: 4.57 E12/L (ref 3.8–5.8)
RBC # BLD: 4.65 E12/L (ref 3.8–5.8)
RBC # BLD: 4.66 E12/L (ref 3.8–5.8)
REPORT: ABNORMAL
REPORT: NORMAL
RESPIRATORY SYNCYTIAL VIRUS BY PCR: NOT DETECTED
SARS-COV-2, NAAT: DETECTED
SARS-COV-2, NAAT: NOT DETECTED
SARS-COV-2, NAAT: NOT DETECTED
SARS-COV-2: DETECTED
SARS-COV-2: NOT DETECTED
SEDIMENTATION RATE, ERYTHROCYTE: 33 MM/HR (ref 0–15)
SODIUM BLD-SCNC: 139 MMOL/L (ref 132–146)
SODIUM BLD-SCNC: 140 MMOL/L (ref 132–146)
SODIUM BLD-SCNC: 141 MMOL/L (ref 132–146)
SODIUM BLD-SCNC: 142 MMOL/L (ref 132–146)
SODIUM BLD-SCNC: 143 MMOL/L (ref 132–146)
SODIUM BLD-SCNC: 143 MMOL/L (ref 132–146)
SODIUM BLD-SCNC: 144 MMOL/L (ref 132–146)
SODIUM BLD-SCNC: 144 MMOL/L (ref 132–146)
SODIUM BLD-SCNC: 148 MMOL/L (ref 132–146)
SODIUM BLD-SCNC: 148 MMOL/L (ref 132–146)
SODIUM BLD-SCNC: 149 MMOL/L (ref 132–146)
SODIUM BLD-SCNC: 149 MMOL/L (ref 132–146)
SODIUM BLD-SCNC: 150 MMOL/L (ref 132–146)
SODIUM BLD-SCNC: 151 MMOL/L (ref 132–146)
SODIUM BLD-SCNC: 154 MMOL/L (ref 132–146)
SODIUM URINE: <20 MMOL/L
SOURCE, BLOOD GAS: ABNORMAL
STREP PNEUMONIAE ANTIGEN, URINE: NORMAL
TEAR DROP CELLS: ABNORMAL
THIS TEST SENT TO: ABNORMAL
THIS TEST SENT TO: NORMAL
TOTAL PROTEIN: 5.4 G/DL (ref 6.4–8.3)
TOTAL PROTEIN: 5.5 G/DL (ref 6.4–8.3)
TOTAL PROTEIN: 5.8 G/DL (ref 6.4–8.3)
TOTAL PROTEIN: 5.8 G/DL (ref 6.4–8.3)
TOTAL PROTEIN: 5.9 G/DL (ref 6.4–8.3)
TOTAL PROTEIN: 5.9 G/DL (ref 6.4–8.3)
TOTAL PROTEIN: 6 G/DL (ref 6.4–8.3)
TOTAL PROTEIN: 6.1 G/DL (ref 6.4–8.3)
TOTAL PROTEIN: 6.2 G/DL (ref 6.4–8.3)
TOTAL PROTEIN: 6.2 G/DL (ref 6.4–8.3)
TOTAL PROTEIN: 6.3 G/DL (ref 6.4–8.3)
TOTAL PROTEIN: 6.3 G/DL (ref 6.4–8.3)
TOTAL PROTEIN: 6.5 G/DL (ref 6.4–8.3)
TOTAL PROTEIN: 6.7 G/DL (ref 6.4–8.3)
TOTAL PROTEIN: 6.8 G/DL (ref 6.4–8.3)
TOTAL PROTEIN: 6.8 G/DL (ref 6.4–8.3)
TRIGL SERPL-MCNC: 78 MG/DL (ref 0–149)
TSH SERPL DL<=0.05 MIU/L-ACNC: 1.77 UIU/ML (ref 0.27–4.2)
VLDLC SERPL CALC-MCNC: 16 MG/DL
WBC # BLD: 10.1 E9/L (ref 4.5–11.5)
WBC # BLD: 13 E9/L (ref 4.5–11.5)
WBC # BLD: 13.1 E9/L (ref 4.5–11.5)
WBC # BLD: 3.3 E9/L (ref 4.5–11.5)
WBC # BLD: 4.8 E9/L (ref 4.5–11.5)
WBC # BLD: 5.8 E9/L (ref 4.5–11.5)
WBC # BLD: 6 E9/L (ref 4.5–11.5)
WBC # BLD: 6.1 E9/L (ref 4.5–11.5)
WBC # BLD: 6.2 E9/L (ref 4.5–11.5)
WBC # BLD: 7.5 E9/L (ref 4.5–11.5)
WBC # BLD: 7.8 E9/L (ref 4.5–11.5)
WBC # BLD: 7.8 E9/L (ref 4.5–11.5)
WBC # BLD: 8.6 E9/L (ref 4.5–11.5)
WBC # BLD: 8.6 E9/L (ref 4.5–11.5)
WBC # BLD: 9.3 E9/L (ref 4.5–11.5)
WBC # BLD: 9.4 E9/L (ref 4.5–11.5)
WBC # BLD: 9.5 E9/L (ref 4.5–11.5)
WBC # BLD: 9.6 E9/L (ref 4.5–11.5)
WBC # BLD: 9.9 E9/L (ref 4.5–11.5)

## 2020-01-01 PROCEDURE — 2700000000 HC OXYGEN THERAPY PER DAY

## 2020-01-01 PROCEDURE — 36415 COLL VENOUS BLD VENIPUNCTURE: CPT

## 2020-01-01 PROCEDURE — 6360000002 HC RX W HCPCS: Performed by: INTERNAL MEDICINE

## 2020-01-01 PROCEDURE — 2580000003 HC RX 258: Performed by: NURSE PRACTITIONER

## 2020-01-01 PROCEDURE — 99232 SBSQ HOSP IP/OBS MODERATE 35: CPT | Performed by: INTERNAL MEDICINE

## 2020-01-01 PROCEDURE — 2500000003 HC RX 250 WO HCPCS: Performed by: INTERNAL MEDICINE

## 2020-01-01 PROCEDURE — 36410 VNPNXR 3YR/> PHY/QHP DX/THER: CPT

## 2020-01-01 PROCEDURE — 92526 ORAL FUNCTION THERAPY: CPT | Performed by: SPEECH-LANGUAGE PATHOLOGIST

## 2020-01-01 PROCEDURE — 97110 THERAPEUTIC EXERCISES: CPT

## 2020-01-01 PROCEDURE — 2060000000 HC ICU INTERMEDIATE R&B

## 2020-01-01 PROCEDURE — 85025 COMPLETE CBC W/AUTO DIFF WBC: CPT

## 2020-01-01 PROCEDURE — 83735 ASSAY OF MAGNESIUM: CPT

## 2020-01-01 PROCEDURE — 70450 CT HEAD/BRAIN W/O DYE: CPT

## 2020-01-01 PROCEDURE — 2580000003 HC RX 258: Performed by: INTERNAL MEDICINE

## 2020-01-01 PROCEDURE — 99232 SBSQ HOSP IP/OBS MODERATE 35: CPT | Performed by: CLINICAL NURSE SPECIALIST

## 2020-01-01 PROCEDURE — 97165 OT EVAL LOW COMPLEX 30 MIN: CPT

## 2020-01-01 PROCEDURE — 6370000000 HC RX 637 (ALT 250 FOR IP): Performed by: INTERNAL MEDICINE

## 2020-01-01 PROCEDURE — C1751 CATH, INF, PER/CENT/MIDLINE: HCPCS

## 2020-01-01 PROCEDURE — 92610 EVALUATE SWALLOWING FUNCTION: CPT | Performed by: SPEECH-LANGUAGE PATHOLOGIST

## 2020-01-01 PROCEDURE — 80053 COMPREHEN METABOLIC PANEL: CPT

## 2020-01-01 PROCEDURE — 82803 BLOOD GASES ANY COMBINATION: CPT

## 2020-01-01 PROCEDURE — 97530 THERAPEUTIC ACTIVITIES: CPT

## 2020-01-01 PROCEDURE — 97535 SELF CARE MNGMENT TRAINING: CPT

## 2020-01-01 PROCEDURE — 92526 ORAL FUNCTION THERAPY: CPT

## 2020-01-01 PROCEDURE — 80061 LIPID PANEL: CPT

## 2020-01-01 PROCEDURE — U0002 COVID-19 LAB TEST NON-CDC: HCPCS

## 2020-01-01 PROCEDURE — 80048 BASIC METABOLIC PNL TOTAL CA: CPT

## 2020-01-01 PROCEDURE — 6370000000 HC RX 637 (ALT 250 FOR IP): Performed by: NURSE PRACTITIONER

## 2020-01-01 PROCEDURE — 97161 PT EVAL LOW COMPLEX 20 MIN: CPT

## 2020-01-01 PROCEDURE — 82962 GLUCOSE BLOOD TEST: CPT

## 2020-01-01 PROCEDURE — 99239 HOSP IP/OBS DSCHRG MGMT >30: CPT | Performed by: INTERNAL MEDICINE

## 2020-01-01 PROCEDURE — 99233 SBSQ HOSP IP/OBS HIGH 50: CPT | Performed by: INTERNAL MEDICINE

## 2020-01-01 PROCEDURE — 83036 HEMOGLOBIN GLYCOSYLATED A1C: CPT

## 2020-01-01 PROCEDURE — 87450 HC DIRECT STREP B ANTIGEN: CPT

## 2020-01-01 PROCEDURE — 82436 ASSAY OF URINE CHLORIDE: CPT

## 2020-01-01 PROCEDURE — 84100 ASSAY OF PHOSPHORUS: CPT

## 2020-01-01 PROCEDURE — 97116 GAIT TRAINING THERAPY: CPT

## 2020-01-01 PROCEDURE — 82044 UR ALBUMIN SEMIQUANTITATIVE: CPT

## 2020-01-01 PROCEDURE — 6360000002 HC RX W HCPCS: Performed by: CLINICAL NURSE SPECIALIST

## 2020-01-01 PROCEDURE — 94760 N-INVAS EAR/PLS OXIMETRY 1: CPT

## 2020-01-01 PROCEDURE — 0100U HC RESPIRPTHGN MULT REV TRANS & AMP PRB TECH 21 TRGT: CPT

## 2020-01-01 PROCEDURE — 83935 ASSAY OF URINE OSMOLALITY: CPT

## 2020-01-01 PROCEDURE — 99222 1ST HOSP IP/OBS MODERATE 55: CPT | Performed by: PHYSICIAN ASSISTANT

## 2020-01-01 PROCEDURE — 6370000000 HC RX 637 (ALT 250 FOR IP)

## 2020-01-01 PROCEDURE — 93010 ELECTROCARDIOGRAM REPORT: CPT | Performed by: INTERNAL MEDICINE

## 2020-01-01 PROCEDURE — 84132 ASSAY OF SERUM POTASSIUM: CPT

## 2020-01-01 PROCEDURE — 99222 1ST HOSP IP/OBS MODERATE 55: CPT | Performed by: NURSE PRACTITIONER

## 2020-01-01 PROCEDURE — 82570 ASSAY OF URINE CREATININE: CPT

## 2020-01-01 PROCEDURE — 6360000002 HC RX W HCPCS: Performed by: NURSE PRACTITIONER

## 2020-01-01 PROCEDURE — 83605 ASSAY OF LACTIC ACID: CPT

## 2020-01-01 PROCEDURE — 71250 CT THORAX DX C-: CPT

## 2020-01-01 PROCEDURE — 84443 ASSAY THYROID STIM HORMONE: CPT

## 2020-01-01 PROCEDURE — 99285 EMERGENCY DEPT VISIT HI MDM: CPT

## 2020-01-01 PROCEDURE — 05H933Z INSERTION OF INFUSION DEVICE INTO RIGHT BRACHIAL VEIN, PERCUTANEOUS APPROACH: ICD-10-PCS | Performed by: INTERNAL MEDICINE

## 2020-01-01 PROCEDURE — 84145 PROCALCITONIN (PCT): CPT

## 2020-01-01 PROCEDURE — 76937 US GUIDE VASCULAR ACCESS: CPT

## 2020-01-01 PROCEDURE — 99232 SBSQ HOSP IP/OBS MODERATE 35: CPT | Performed by: NURSE PRACTITIONER

## 2020-01-01 PROCEDURE — 85651 RBC SED RATE NONAUTOMATED: CPT

## 2020-01-01 PROCEDURE — 86140 C-REACTIVE PROTEIN: CPT

## 2020-01-01 PROCEDURE — 87040 BLOOD CULTURE FOR BACTERIA: CPT

## 2020-01-01 PROCEDURE — 85027 COMPLETE CBC AUTOMATED: CPT

## 2020-01-01 PROCEDURE — 84300 ASSAY OF URINE SODIUM: CPT

## 2020-01-01 PROCEDURE — 93005 ELECTROCARDIOGRAM TRACING: CPT | Performed by: EMERGENCY MEDICINE

## 2020-01-01 PROCEDURE — 87502 INFLUENZA DNA AMP PROBE: CPT

## 2020-01-01 RX ORDER — CLOPIDOGREL BISULFATE 75 MG/1
75 TABLET ORAL EVERY MORNING
Status: DISCONTINUED | OUTPATIENT
Start: 2020-01-01 | End: 2020-01-01 | Stop reason: HOSPADM

## 2020-01-01 RX ORDER — HEPARIN SODIUM (PORCINE) LOCK FLUSH IV SOLN 100 UNIT/ML 100 UNIT/ML
1 SOLUTION INTRAVENOUS EVERY 12 HOURS SCHEDULED
Status: DISCONTINUED | OUTPATIENT
Start: 2020-01-01 | End: 2020-01-01 | Stop reason: HOSPADM

## 2020-01-01 RX ORDER — POTASSIUM CHLORIDE 20 MEQ/1
40 TABLET, EXTENDED RELEASE ORAL ONCE
Status: COMPLETED | OUTPATIENT
Start: 2020-01-01 | End: 2020-01-01

## 2020-01-01 RX ORDER — LEVOFLOXACIN 250 MG/1
250 TABLET ORAL DAILY
Qty: 3 TABLET | Refills: 0 | Status: SHIPPED | OUTPATIENT
Start: 2020-01-01 | End: 2020-01-01

## 2020-01-01 RX ORDER — DEXTROSE MONOHYDRATE 50 MG/ML
INJECTION, SOLUTION INTRAVENOUS CONTINUOUS
Status: DISCONTINUED | OUTPATIENT
Start: 2020-01-01 | End: 2020-01-01

## 2020-01-01 RX ORDER — HYDROXYCHLOROQUINE SULFATE 200 MG/1
200 TABLET, FILM COATED ORAL 2 TIMES DAILY
Status: DISCONTINUED | OUTPATIENT
Start: 2020-01-01 | End: 2020-01-01

## 2020-01-01 RX ORDER — DEXTROSE AND SODIUM CHLORIDE 5; .45 G/100ML; G/100ML
INJECTION, SOLUTION INTRAVENOUS CONTINUOUS
Status: ACTIVE | OUTPATIENT
Start: 2020-01-01 | End: 2020-01-01

## 2020-01-01 RX ORDER — POTASSIUM CHLORIDE 20 MEQ/1
20 TABLET, EXTENDED RELEASE ORAL ONCE
Status: COMPLETED | OUTPATIENT
Start: 2020-01-01 | End: 2020-01-01

## 2020-01-01 RX ORDER — ATORVASTATIN CALCIUM 40 MG/1
40 TABLET, FILM COATED ORAL DAILY
Status: DISCONTINUED | OUTPATIENT
Start: 2020-01-01 | End: 2020-01-01 | Stop reason: HOSPADM

## 2020-01-01 RX ORDER — MORPHINE SULFATE 2 MG/ML
2 INJECTION, SOLUTION INTRAMUSCULAR; INTRAVENOUS ONCE
Status: COMPLETED | OUTPATIENT
Start: 2020-01-01 | End: 2020-01-01

## 2020-01-01 RX ORDER — ACETAMINOPHEN 325 MG/1
650 TABLET ORAL EVERY 6 HOURS PRN
Status: DISCONTINUED | OUTPATIENT
Start: 2020-01-01 | End: 2020-01-01 | Stop reason: HOSPADM

## 2020-01-01 RX ORDER — POLYETHYLENE GLYCOL 3350 17 G/17G
17 POWDER, FOR SOLUTION ORAL DAILY PRN
Status: DISCONTINUED | OUTPATIENT
Start: 2020-01-01 | End: 2020-01-01 | Stop reason: HOSPADM

## 2020-01-01 RX ORDER — POTASSIUM BICARBONATE 25 MEQ/1
25 TABLET, EFFERVESCENT ORAL 2 TIMES DAILY
Status: DISCONTINUED | OUTPATIENT
Start: 2020-01-01 | End: 2020-01-01 | Stop reason: CLARIF

## 2020-01-01 RX ORDER — HYDROXYCHLOROQUINE SULFATE 200 MG/1
400 TABLET, FILM COATED ORAL 2 TIMES DAILY
Status: COMPLETED | OUTPATIENT
Start: 2020-01-01 | End: 2020-01-01

## 2020-01-01 RX ORDER — SODIUM CHLORIDE 0.9 % (FLUSH) 0.9 %
10 SYRINGE (ML) INJECTION EVERY 12 HOURS SCHEDULED
Status: DISCONTINUED | OUTPATIENT
Start: 2020-01-01 | End: 2020-01-01 | Stop reason: HOSPADM

## 2020-01-01 RX ORDER — SODIUM CHLORIDE 9 MG/ML
INJECTION, SOLUTION INTRAVENOUS CONTINUOUS
Status: DISCONTINUED | OUTPATIENT
Start: 2020-01-01 | End: 2020-01-01

## 2020-01-01 RX ORDER — DOCUSATE SODIUM 100 MG/1
100 CAPSULE, LIQUID FILLED ORAL DAILY
Status: DISCONTINUED | OUTPATIENT
Start: 2020-01-01 | End: 2020-01-01 | Stop reason: HOSPADM

## 2020-01-01 RX ORDER — ASPIRIN 81 MG/1
81 TABLET, CHEWABLE ORAL EVERY MORNING
Status: DISCONTINUED | OUTPATIENT
Start: 2020-01-01 | End: 2020-01-01 | Stop reason: HOSPADM

## 2020-01-01 RX ORDER — HYDROXYCHLOROQUINE SULFATE 200 MG/1
200 TABLET, FILM COATED ORAL 2 TIMES DAILY
Status: DISCONTINUED | OUTPATIENT
Start: 2020-01-01 | End: 2020-01-01 | Stop reason: CLARIF

## 2020-01-01 RX ORDER — HEPARIN SODIUM (PORCINE) LOCK FLUSH IV SOLN 100 UNIT/ML 100 UNIT/ML
1 SOLUTION INTRAVENOUS PRN
Status: DISCONTINUED | OUTPATIENT
Start: 2020-01-01 | End: 2020-01-01 | Stop reason: HOSPADM

## 2020-01-01 RX ORDER — SODIUM CHLORIDE 0.9 % (FLUSH) 0.9 %
10 SYRINGE (ML) INJECTION PRN
Status: DISCONTINUED | OUTPATIENT
Start: 2020-01-01 | End: 2020-01-01 | Stop reason: HOSPADM

## 2020-01-01 RX ORDER — LEVOFLOXACIN 250 MG/1
250 TABLET ORAL DAILY
Status: DISCONTINUED | OUTPATIENT
Start: 2020-01-01 | End: 2020-01-01

## 2020-01-01 RX ORDER — MAGNESIUM SULFATE IN WATER 40 MG/ML
2 INJECTION, SOLUTION INTRAVENOUS ONCE
Status: COMPLETED | OUTPATIENT
Start: 2020-01-01 | End: 2020-01-01

## 2020-01-01 RX ORDER — LEVOTHYROXINE SODIUM 0.05 MG/1
TABLET ORAL
Status: COMPLETED
Start: 2020-01-01 | End: 2020-01-01

## 2020-01-01 RX ORDER — MAGNESIUM SULFATE 1 G/100ML
1 INJECTION INTRAVENOUS ONCE
Status: COMPLETED | OUTPATIENT
Start: 2020-01-01 | End: 2020-01-01

## 2020-01-01 RX ORDER — SODIUM CHLORIDE 9 MG/ML
INJECTION, SOLUTION INTRAVENOUS CONTINUOUS
Status: ACTIVE | OUTPATIENT
Start: 2020-01-01 | End: 2020-01-01

## 2020-01-01 RX ORDER — LEVOTHYROXINE SODIUM 0.05 MG/1
50 TABLET ORAL
Status: DISCONTINUED | OUTPATIENT
Start: 2020-01-01 | End: 2020-01-01 | Stop reason: HOSPADM

## 2020-01-01 RX ORDER — ACETAMINOPHEN 650 MG/1
650 SUPPOSITORY RECTAL EVERY 6 HOURS PRN
Status: DISCONTINUED | OUTPATIENT
Start: 2020-01-01 | End: 2020-01-01 | Stop reason: HOSPADM

## 2020-01-01 RX ORDER — CLOTRIMAZOLE 10 MG/1
10 LOZENGE ORAL; TOPICAL
Status: DISCONTINUED | OUTPATIENT
Start: 2020-01-01 | End: 2020-01-01

## 2020-01-01 RX ORDER — LEVOFLOXACIN 250 MG/1
250 TABLET ORAL DAILY
Status: COMPLETED | OUTPATIENT
Start: 2020-01-01 | End: 2020-01-01

## 2020-01-01 RX ORDER — MORPHINE SULFATE 2 MG/ML
2 INJECTION, SOLUTION INTRAMUSCULAR; INTRAVENOUS EVERY 4 HOURS PRN
Status: DISCONTINUED | OUTPATIENT
Start: 2020-01-01 | End: 2020-01-01 | Stop reason: HOSPADM

## 2020-01-01 RX ORDER — POTASSIUM CHLORIDE 7.45 MG/ML
10 INJECTION INTRAVENOUS
Status: COMPLETED | OUTPATIENT
Start: 2020-01-01 | End: 2020-01-01

## 2020-01-01 RX ORDER — ONDANSETRON 2 MG/ML
4 INJECTION INTRAMUSCULAR; INTRAVENOUS EVERY 6 HOURS PRN
Status: DISCONTINUED | OUTPATIENT
Start: 2020-01-01 | End: 2020-01-01 | Stop reason: HOSPADM

## 2020-01-01 RX ORDER — HYDRALAZINE HYDROCHLORIDE 20 MG/ML
5 INJECTION INTRAMUSCULAR; INTRAVENOUS ONCE
Status: COMPLETED | OUTPATIENT
Start: 2020-01-01 | End: 2020-01-01

## 2020-01-01 RX ORDER — VITAMIN B COMPLEX
1000 TABLET ORAL EVERY MORNING
Status: DISCONTINUED | OUTPATIENT
Start: 2020-01-01 | End: 2020-01-01 | Stop reason: HOSPADM

## 2020-01-01 RX ORDER — LISINOPRIL 10 MG/1
10 TABLET ORAL EVERY MORNING
Status: DISCONTINUED | OUTPATIENT
Start: 2020-01-01 | End: 2020-01-01

## 2020-01-01 RX ORDER — PROMETHAZINE HYDROCHLORIDE 25 MG/1
12.5 TABLET ORAL EVERY 6 HOURS PRN
Status: DISCONTINUED | OUTPATIENT
Start: 2020-01-01 | End: 2020-01-01 | Stop reason: HOSPADM

## 2020-01-01 RX ADMIN — POTASSIUM PHOSPHATE, MONOBASIC AND POTASSIUM PHOSPHATE, DIBASIC 10 MMOL: 224; 236 INJECTION, SOLUTION, CONCENTRATE INTRAVENOUS at 15:43

## 2020-01-01 RX ADMIN — ATORVASTATIN CALCIUM 40 MG: 40 TABLET, FILM COATED ORAL at 09:15

## 2020-01-01 RX ADMIN — SODIUM CHLORIDE: 9 INJECTION, SOLUTION INTRAVENOUS at 05:42

## 2020-01-01 RX ADMIN — SODIUM CHLORIDE, PRESERVATIVE FREE 100 UNITS: 5 INJECTION INTRAVENOUS at 09:44

## 2020-01-01 RX ADMIN — LEVOTHYROXINE SODIUM 50 MCG: 50 TABLET ORAL at 05:45

## 2020-01-01 RX ADMIN — Medication 10 ML: at 08:52

## 2020-01-01 RX ADMIN — SODIUM CHLORIDE, PRESERVATIVE FREE 100 UNITS: 5 INJECTION INTRAVENOUS at 10:31

## 2020-01-01 RX ADMIN — DOCUSATE SODIUM 100 MG: 100 CAPSULE, LIQUID FILLED ORAL at 09:15

## 2020-01-01 RX ADMIN — Medication 10 ML: at 08:24

## 2020-01-01 RX ADMIN — CLOTRIMAZOLE 10 MG: 10 LOZENGE ORAL; TOPICAL at 11:45

## 2020-01-01 RX ADMIN — METFORMIN HYDROCHLORIDE 1000 MG: 1000 TABLET, FILM COATED ORAL at 10:35

## 2020-01-01 RX ADMIN — METFORMIN HYDROCHLORIDE 1000 MG: 1000 TABLET, FILM COATED ORAL at 08:29

## 2020-01-01 RX ADMIN — CLOTRIMAZOLE 10 MG: 10 LOZENGE ORAL; TOPICAL at 21:43

## 2020-01-01 RX ADMIN — ENOXAPARIN SODIUM 40 MG: 40 INJECTION SUBCUTANEOUS at 10:32

## 2020-01-01 RX ADMIN — ASPIRIN 81 MG 81 MG: 81 TABLET ORAL at 08:04

## 2020-01-01 RX ADMIN — LISINOPRIL 10 MG: 10 TABLET ORAL at 08:04

## 2020-01-01 RX ADMIN — LEVOFLOXACIN 250 MG: 250 TABLET, FILM COATED ORAL at 08:28

## 2020-01-01 RX ADMIN — CLOPIDOGREL BISULFATE 75 MG: 75 TABLET ORAL at 08:31

## 2020-01-01 RX ADMIN — DOCUSATE SODIUM 100 MG: 100 CAPSULE, LIQUID FILLED ORAL at 08:38

## 2020-01-01 RX ADMIN — Medication 10 ML: at 21:44

## 2020-01-01 RX ADMIN — Medication 10 ML: at 21:01

## 2020-01-01 RX ADMIN — HYDROXYCHLOROQUINE SULFATE 200 MG: 200 TABLET, FILM COATED ORAL at 08:51

## 2020-01-01 RX ADMIN — LEVOTHYROXINE SODIUM 50 MCG: 50 TABLET ORAL at 08:31

## 2020-01-01 RX ADMIN — DOCUSATE SODIUM 100 MG: 100 CAPSULE, LIQUID FILLED ORAL at 08:28

## 2020-01-01 RX ADMIN — VITAMIN D, TAB 1000IU (100/BT) 1000 UNITS: 25 TAB at 09:11

## 2020-01-01 RX ADMIN — CLOPIDOGREL BISULFATE 75 MG: 75 TABLET ORAL at 09:43

## 2020-01-01 RX ADMIN — CLOPIDOGREL BISULFATE 75 MG: 75 TABLET ORAL at 08:28

## 2020-01-01 RX ADMIN — LISINOPRIL 10 MG: 10 TABLET ORAL at 08:26

## 2020-01-01 RX ADMIN — HYDROXYCHLOROQUINE SULFATE 200 MG: 200 TABLET, FILM COATED ORAL at 21:50

## 2020-01-01 RX ADMIN — METFORMIN HYDROCHLORIDE 1000 MG: 1000 TABLET, FILM COATED ORAL at 08:51

## 2020-01-01 RX ADMIN — CLOTRIMAZOLE 10 MG: 10 LOZENGE ORAL; TOPICAL at 21:20

## 2020-01-01 RX ADMIN — ENOXAPARIN SODIUM 40 MG: 40 INJECTION SUBCUTANEOUS at 08:38

## 2020-01-01 RX ADMIN — VITAMIN D, TAB 1000IU (100/BT) 1000 UNITS: 25 TAB at 09:15

## 2020-01-01 RX ADMIN — LISINOPRIL 10 MG: 10 TABLET ORAL at 09:25

## 2020-01-01 RX ADMIN — HYDRALAZINE HYDROCHLORIDE 5 MG: 20 INJECTION INTRAMUSCULAR; INTRAVENOUS at 10:11

## 2020-01-01 RX ADMIN — CLOTRIMAZOLE 10 MG: 10 LOZENGE ORAL; TOPICAL at 10:00

## 2020-01-01 RX ADMIN — Medication 10 ML: at 10:19

## 2020-01-01 RX ADMIN — ACETAMINOPHEN 650 MG: 325 TABLET ORAL at 05:22

## 2020-01-01 RX ADMIN — CLOPIDOGREL BISULFATE 75 MG: 75 TABLET ORAL at 09:44

## 2020-01-01 RX ADMIN — Medication 10 ML: at 09:11

## 2020-01-01 RX ADMIN — METFORMIN HYDROCHLORIDE 1000 MG: 1000 TABLET, FILM COATED ORAL at 18:45

## 2020-01-01 RX ADMIN — METFORMIN HYDROCHLORIDE 1000 MG: 1000 TABLET, FILM COATED ORAL at 09:43

## 2020-01-01 RX ADMIN — ASPIRIN 81 MG 81 MG: 81 TABLET ORAL at 10:31

## 2020-01-01 RX ADMIN — CLOTRIMAZOLE 10 MG: 10 LOZENGE ORAL; TOPICAL at 10:33

## 2020-01-01 RX ADMIN — CLOTRIMAZOLE 10 MG: 10 LOZENGE ORAL; TOPICAL at 12:32

## 2020-01-01 RX ADMIN — ENOXAPARIN SODIUM 40 MG: 40 INJECTION SUBCUTANEOUS at 08:51

## 2020-01-01 RX ADMIN — WATER 1 G: 1 INJECTION INTRAMUSCULAR; INTRAVENOUS; SUBCUTANEOUS at 17:33

## 2020-01-01 RX ADMIN — LEVOTHYROXINE SODIUM 50 MCG: 50 TABLET ORAL at 06:14

## 2020-01-01 RX ADMIN — LISINOPRIL 10 MG: 10 TABLET ORAL at 08:38

## 2020-01-01 RX ADMIN — CLOPIDOGREL BISULFATE 75 MG: 75 TABLET ORAL at 09:11

## 2020-01-01 RX ADMIN — POTASSIUM CHLORIDE 20 MEQ: 20 TABLET, EXTENDED RELEASE ORAL at 16:35

## 2020-01-01 RX ADMIN — METFORMIN HYDROCHLORIDE 1000 MG: 1000 TABLET, FILM COATED ORAL at 16:44

## 2020-01-01 RX ADMIN — METFORMIN HYDROCHLORIDE 1000 MG: 1000 TABLET, FILM COATED ORAL at 08:31

## 2020-01-01 RX ADMIN — DOCUSATE SODIUM 100 MG: 100 CAPSULE, LIQUID FILLED ORAL at 08:31

## 2020-01-01 RX ADMIN — Medication 10 ML: at 20:22

## 2020-01-01 RX ADMIN — ASPIRIN 81 MG 81 MG: 81 TABLET ORAL at 08:38

## 2020-01-01 RX ADMIN — METFORMIN HYDROCHLORIDE 1000 MG: 1000 TABLET, FILM COATED ORAL at 17:21

## 2020-01-01 RX ADMIN — CLOTRIMAZOLE 10 MG: 10 LOZENGE ORAL; TOPICAL at 08:29

## 2020-01-01 RX ADMIN — ASPIRIN 81 MG 81 MG: 81 TABLET ORAL at 08:29

## 2020-01-01 RX ADMIN — ATORVASTATIN CALCIUM 40 MG: 40 TABLET, FILM COATED ORAL at 10:35

## 2020-01-01 RX ADMIN — HYDROXYCHLOROQUINE SULFATE 200 MG: 200 TABLET, FILM COATED ORAL at 09:26

## 2020-01-01 RX ADMIN — ASPIRIN 81 MG 81 MG: 81 TABLET ORAL at 09:42

## 2020-01-01 RX ADMIN — ATORVASTATIN CALCIUM 40 MG: 40 TABLET, FILM COATED ORAL at 10:00

## 2020-01-01 RX ADMIN — METFORMIN HYDROCHLORIDE 1000 MG: 1000 TABLET, FILM COATED ORAL at 09:55

## 2020-01-01 RX ADMIN — ASPIRIN 81 MG 81 MG: 81 TABLET ORAL at 09:25

## 2020-01-01 RX ADMIN — CLOTRIMAZOLE 10 MG: 10 LOZENGE ORAL; TOPICAL at 04:32

## 2020-01-01 RX ADMIN — CLOTRIMAZOLE 10 MG: 10 LOZENGE ORAL; TOPICAL at 11:49

## 2020-01-01 RX ADMIN — CLOTRIMAZOLE 10 MG: 10 LOZENGE ORAL; TOPICAL at 05:21

## 2020-01-01 RX ADMIN — CLOPIDOGREL BISULFATE 75 MG: 75 TABLET ORAL at 08:26

## 2020-01-01 RX ADMIN — ENOXAPARIN SODIUM 40 MG: 40 INJECTION SUBCUTANEOUS at 08:03

## 2020-01-01 RX ADMIN — Medication 10 ML: at 10:31

## 2020-01-01 RX ADMIN — LEVOTHYROXINE SODIUM 50 MCG: 50 TABLET ORAL at 06:41

## 2020-01-01 RX ADMIN — Medication 10 ML: at 21:21

## 2020-01-01 RX ADMIN — CLOPIDOGREL BISULFATE 75 MG: 75 TABLET ORAL at 09:16

## 2020-01-01 RX ADMIN — Medication 10 ML: at 09:00

## 2020-01-01 RX ADMIN — CLOPIDOGREL BISULFATE 75 MG: 75 TABLET ORAL at 09:56

## 2020-01-01 RX ADMIN — DEXTROSE MONOHYDRATE: 50 INJECTION, SOLUTION INTRAVENOUS at 16:34

## 2020-01-01 RX ADMIN — ENOXAPARIN SODIUM 40 MG: 40 INJECTION SUBCUTANEOUS at 10:11

## 2020-01-01 RX ADMIN — POTASSIUM PHOSPHATE, MONOBASIC AND POTASSIUM PHOSPHATE, DIBASIC 20 MMOL: 224; 236 INJECTION, SOLUTION, CONCENTRATE INTRAVENOUS at 16:54

## 2020-01-01 RX ADMIN — LEVOTHYROXINE SODIUM 50 MCG: 50 TABLET ORAL at 06:38

## 2020-01-01 RX ADMIN — CLOPIDOGREL BISULFATE 75 MG: 75 TABLET ORAL at 08:51

## 2020-01-01 RX ADMIN — Medication 10 ML: at 21:46

## 2020-01-01 RX ADMIN — ATORVASTATIN CALCIUM 40 MG: 40 TABLET, FILM COATED ORAL at 09:10

## 2020-01-01 RX ADMIN — DEXTROSE AND SODIUM CHLORIDE: 5; 450 INJECTION, SOLUTION INTRAVENOUS at 17:10

## 2020-01-01 RX ADMIN — MORPHINE SULFATE 2 MG: 2 INJECTION, SOLUTION INTRAMUSCULAR; INTRAVENOUS at 15:54

## 2020-01-01 RX ADMIN — ATORVASTATIN CALCIUM 40 MG: 40 TABLET, FILM COATED ORAL at 08:28

## 2020-01-01 RX ADMIN — CLOTRIMAZOLE 10 MG: 10 LOZENGE ORAL; TOPICAL at 00:12

## 2020-01-01 RX ADMIN — LEVOTHYROXINE SODIUM 50 MCG: 50 TABLET ORAL at 05:02

## 2020-01-01 RX ADMIN — ENOXAPARIN SODIUM 40 MG: 40 INJECTION SUBCUTANEOUS at 08:24

## 2020-01-01 RX ADMIN — SODIUM CHLORIDE: 9 INJECTION, SOLUTION INTRAVENOUS at 16:05

## 2020-01-01 RX ADMIN — HYDROXYCHLOROQUINE SULFATE 400 MG: 200 TABLET, FILM COATED ORAL at 20:22

## 2020-01-01 RX ADMIN — Medication 10 ML: at 09:55

## 2020-01-01 RX ADMIN — LEVOTHYROXINE SODIUM 50 MCG: 50 TABLET ORAL at 05:42

## 2020-01-01 RX ADMIN — Medication 10 ML: at 20:41

## 2020-01-01 RX ADMIN — METFORMIN HYDROCHLORIDE 1000 MG: 1000 TABLET, FILM COATED ORAL at 10:31

## 2020-01-01 RX ADMIN — ENOXAPARIN SODIUM 40 MG: 40 INJECTION SUBCUTANEOUS at 09:15

## 2020-01-01 RX ADMIN — ASPIRIN 81 MG 81 MG: 81 TABLET ORAL at 08:51

## 2020-01-01 RX ADMIN — POTASSIUM CHLORIDE 40 MEQ: 20 TABLET, EXTENDED RELEASE ORAL at 16:38

## 2020-01-01 RX ADMIN — CLOTRIMAZOLE 10 MG: 10 LOZENGE ORAL; TOPICAL at 09:01

## 2020-01-01 RX ADMIN — ASPIRIN 81 MG 81 MG: 81 TABLET ORAL at 08:26

## 2020-01-01 RX ADMIN — ASPIRIN 81 MG 81 MG: 81 TABLET ORAL at 08:31

## 2020-01-01 RX ADMIN — METFORMIN HYDROCHLORIDE 1000 MG: 1000 TABLET, FILM COATED ORAL at 08:04

## 2020-01-01 RX ADMIN — METFORMIN HYDROCHLORIDE 1000 MG: 1000 TABLET, FILM COATED ORAL at 17:36

## 2020-01-01 RX ADMIN — CLOPIDOGREL BISULFATE 75 MG: 75 TABLET ORAL at 10:35

## 2020-01-01 RX ADMIN — Medication 10 ML: at 09:44

## 2020-01-01 RX ADMIN — LEVOTHYROXINE SODIUM 50 MCG: 50 TABLET ORAL at 05:22

## 2020-01-01 RX ADMIN — Medication 10 ML: at 08:04

## 2020-01-01 RX ADMIN — ENOXAPARIN SODIUM 40 MG: 40 INJECTION SUBCUTANEOUS at 09:44

## 2020-01-01 RX ADMIN — ASPIRIN 81 MG 81 MG: 81 TABLET ORAL at 10:01

## 2020-01-01 RX ADMIN — ATORVASTATIN CALCIUM 40 MG: 40 TABLET, FILM COATED ORAL at 18:45

## 2020-01-01 RX ADMIN — LISINOPRIL 10 MG: 10 TABLET ORAL at 09:56

## 2020-01-01 RX ADMIN — HYDROXYCHLOROQUINE SULFATE 200 MG: 200 TABLET, FILM COATED ORAL at 20:41

## 2020-01-01 RX ADMIN — ATORVASTATIN CALCIUM 40 MG: 40 TABLET, FILM COATED ORAL at 09:43

## 2020-01-01 RX ADMIN — SODIUM CHLORIDE, PRESERVATIVE FREE 100 UNITS: 5 INJECTION INTRAVENOUS at 21:48

## 2020-01-01 RX ADMIN — CLOPIDOGREL BISULFATE 75 MG: 75 TABLET ORAL at 08:04

## 2020-01-01 RX ADMIN — CLOTRIMAZOLE 10 MG: 10 LOZENGE ORAL; TOPICAL at 16:35

## 2020-01-01 RX ADMIN — ATORVASTATIN CALCIUM 40 MG: 40 TABLET, FILM COATED ORAL at 08:51

## 2020-01-01 RX ADMIN — Medication 10 ML: at 21:50

## 2020-01-01 RX ADMIN — WATER 1 G: 1 INJECTION INTRAMUSCULAR; INTRAVENOUS; SUBCUTANEOUS at 16:43

## 2020-01-01 RX ADMIN — Medication 10 ML: at 23:10

## 2020-01-01 RX ADMIN — VITAMIN D, TAB 1000IU (100/BT) 1000 UNITS: 25 TAB at 10:31

## 2020-01-01 RX ADMIN — METFORMIN HYDROCHLORIDE 1000 MG: 1000 TABLET, FILM COATED ORAL at 08:38

## 2020-01-01 RX ADMIN — SODIUM CHLORIDE, PRESERVATIVE FREE 10 ML: 5 INJECTION INTRAVENOUS at 09:19

## 2020-01-01 RX ADMIN — ATORVASTATIN CALCIUM 40 MG: 40 TABLET, FILM COATED ORAL at 09:44

## 2020-01-01 RX ADMIN — VITAMIN D, TAB 1000IU (100/BT) 1000 UNITS: 25 TAB at 10:18

## 2020-01-01 RX ADMIN — METFORMIN HYDROCHLORIDE 1000 MG: 1000 TABLET, FILM COATED ORAL at 16:35

## 2020-01-01 RX ADMIN — METFORMIN HYDROCHLORIDE 1000 MG: 1000 TABLET, FILM COATED ORAL at 10:00

## 2020-01-01 RX ADMIN — HYDROXYCHLOROQUINE SULFATE 200 MG: 200 TABLET, FILM COATED ORAL at 09:11

## 2020-01-01 RX ADMIN — VITAMIN D, TAB 1000IU (100/BT) 1000 UNITS: 25 TAB at 08:38

## 2020-01-01 RX ADMIN — Medication 10 ML: at 08:34

## 2020-01-01 RX ADMIN — VITAMIN D, TAB 1000IU (100/BT) 1000 UNITS: 25 TAB at 08:31

## 2020-01-01 RX ADMIN — ATORVASTATIN CALCIUM 40 MG: 40 TABLET, FILM COATED ORAL at 08:31

## 2020-01-01 RX ADMIN — DOCUSATE SODIUM 100 MG: 100 CAPSULE, LIQUID FILLED ORAL at 08:04

## 2020-01-01 RX ADMIN — CLOTRIMAZOLE 10 MG: 10 LOZENGE ORAL; TOPICAL at 06:37

## 2020-01-01 RX ADMIN — Medication 10 ML: at 08:38

## 2020-01-01 RX ADMIN — ASPIRIN 81 MG 81 MG: 81 TABLET ORAL at 09:55

## 2020-01-01 RX ADMIN — CLOTRIMAZOLE 10 MG: 10 LOZENGE ORAL; TOPICAL at 21:00

## 2020-01-01 RX ADMIN — ACETAMINOPHEN 650 MG: 325 TABLET ORAL at 10:35

## 2020-01-01 RX ADMIN — CLOTRIMAZOLE 10 MG: 10 LOZENGE ORAL; TOPICAL at 14:18

## 2020-01-01 RX ADMIN — Medication 10 ML: at 19:27

## 2020-01-01 RX ADMIN — ENOXAPARIN SODIUM 40 MG: 40 INJECTION SUBCUTANEOUS at 09:11

## 2020-01-01 RX ADMIN — SODIUM CHLORIDE, PRESERVATIVE FREE 100 UNITS: 5 INJECTION INTRAVENOUS at 20:05

## 2020-01-01 RX ADMIN — SODIUM CHLORIDE: 9 INJECTION, SOLUTION INTRAVENOUS at 14:18

## 2020-01-01 RX ADMIN — CLOTRIMAZOLE 10 MG: 10 LOZENGE ORAL; TOPICAL at 09:42

## 2020-01-01 RX ADMIN — LEVOTHYROXINE SODIUM 50 MCG: 50 TABLET ORAL at 06:08

## 2020-01-01 RX ADMIN — LEVOTHYROXINE SODIUM 50 MCG: 50 TABLET ORAL at 06:46

## 2020-01-01 RX ADMIN — MAGNESIUM SULFATE HEPTAHYDRATE 2 G: 40 INJECTION, SOLUTION INTRAVENOUS at 10:35

## 2020-01-01 RX ADMIN — ATORVASTATIN CALCIUM 40 MG: 40 TABLET, FILM COATED ORAL at 10:18

## 2020-01-01 RX ADMIN — VITAMIN D, TAB 1000IU (100/BT) 1000 UNITS: 25 TAB at 09:44

## 2020-01-01 RX ADMIN — Medication 10 ML: at 20:29

## 2020-01-01 RX ADMIN — LEVOTHYROXINE SODIUM 50 MCG: 50 TABLET ORAL at 08:28

## 2020-01-01 RX ADMIN — HYDROXYCHLOROQUINE SULFATE 200 MG: 200 TABLET, FILM COATED ORAL at 22:14

## 2020-01-01 RX ADMIN — DEXTROSE MONOHYDRATE: 50 INJECTION, SOLUTION INTRAVENOUS at 20:00

## 2020-01-01 RX ADMIN — HYDROXYCHLOROQUINE SULFATE 200 MG: 200 TABLET, FILM COATED ORAL at 09:55

## 2020-01-01 RX ADMIN — POTASSIUM CHLORIDE 10 MEQ: 10 INJECTION, SOLUTION INTRAVENOUS at 14:16

## 2020-01-01 RX ADMIN — SODIUM CHLORIDE, PRESERVATIVE FREE 100 UNITS: 5 INJECTION INTRAVENOUS at 23:51

## 2020-01-01 RX ADMIN — CLOTRIMAZOLE 10 MG: 10 LOZENGE ORAL; TOPICAL at 12:04

## 2020-01-01 RX ADMIN — ATORVASTATIN CALCIUM 40 MG: 40 TABLET, FILM COATED ORAL at 08:38

## 2020-01-01 RX ADMIN — SODIUM CHLORIDE, PRESERVATIVE FREE 100 UNITS: 5 INJECTION INTRAVENOUS at 20:45

## 2020-01-01 RX ADMIN — METFORMIN HYDROCHLORIDE 1000 MG: 1000 TABLET, FILM COATED ORAL at 09:15

## 2020-01-01 RX ADMIN — CLOTRIMAZOLE 10 MG: 10 LOZENGE ORAL; TOPICAL at 19:26

## 2020-01-01 RX ADMIN — POTASSIUM CHLORIDE 10 MEQ: 10 INJECTION, SOLUTION INTRAVENOUS at 12:25

## 2020-01-01 RX ADMIN — Medication 10 ML: at 08:31

## 2020-01-01 RX ADMIN — METFORMIN HYDROCHLORIDE 1000 MG: 1000 TABLET, FILM COATED ORAL at 16:40

## 2020-01-01 RX ADMIN — SODIUM CHLORIDE, PRESERVATIVE FREE 100 UNITS: 5 INJECTION INTRAVENOUS at 19:59

## 2020-01-01 RX ADMIN — CLOTRIMAZOLE 10 MG: 10 LOZENGE ORAL; TOPICAL at 13:19

## 2020-01-01 RX ADMIN — HYDROXYCHLOROQUINE SULFATE 200 MG: 200 TABLET, FILM COATED ORAL at 21:14

## 2020-01-01 RX ADMIN — DOCUSATE SODIUM 100 MG: 100 CAPSULE, LIQUID FILLED ORAL at 10:00

## 2020-01-01 RX ADMIN — CLOPIDOGREL BISULFATE 75 MG: 75 TABLET ORAL at 08:38

## 2020-01-01 RX ADMIN — Medication 10 ML: at 22:27

## 2020-01-01 RX ADMIN — ATORVASTATIN CALCIUM 40 MG: 40 TABLET, FILM COATED ORAL at 08:04

## 2020-01-01 RX ADMIN — LISINOPRIL 10 MG: 10 TABLET ORAL at 08:31

## 2020-01-01 RX ADMIN — METFORMIN HYDROCHLORIDE 1000 MG: 1000 TABLET, FILM COATED ORAL at 16:37

## 2020-01-01 RX ADMIN — CLOPIDOGREL BISULFATE 75 MG: 75 TABLET ORAL at 09:26

## 2020-01-01 RX ADMIN — ACETAMINOPHEN 650 MG: 325 TABLET ORAL at 00:14

## 2020-01-01 RX ADMIN — VITAMIN D, TAB 1000IU (100/BT) 1000 UNITS: 25 TAB at 08:29

## 2020-01-01 RX ADMIN — WATER 1 G: 1 INJECTION INTRAMUSCULAR; INTRAVENOUS; SUBCUTANEOUS at 15:50

## 2020-01-01 RX ADMIN — ATORVASTATIN CALCIUM 40 MG: 40 TABLET, FILM COATED ORAL at 08:29

## 2020-01-01 RX ADMIN — METFORMIN HYDROCHLORIDE 1000 MG: 1000 TABLET, FILM COATED ORAL at 17:10

## 2020-01-01 RX ADMIN — VITAMIN D, TAB 1000IU (100/BT) 1000 UNITS: 25 TAB at 10:35

## 2020-01-01 RX ADMIN — CLOTRIMAZOLE 10 MG: 10 LOZENGE ORAL; TOPICAL at 06:39

## 2020-01-01 RX ADMIN — Medication 10 ML: at 22:14

## 2020-01-01 RX ADMIN — LISINOPRIL 10 MG: 10 TABLET ORAL at 10:47

## 2020-01-01 RX ADMIN — CLOTRIMAZOLE 10 MG: 10 LOZENGE ORAL; TOPICAL at 16:37

## 2020-01-01 RX ADMIN — WATER 1 G: 1 INJECTION INTRAMUSCULAR; INTRAVENOUS; SUBCUTANEOUS at 17:12

## 2020-01-01 RX ADMIN — CLOTRIMAZOLE 10 MG: 10 LOZENGE ORAL; TOPICAL at 20:07

## 2020-01-01 RX ADMIN — CLOTRIMAZOLE 10 MG: 10 LOZENGE ORAL; TOPICAL at 06:41

## 2020-01-01 RX ADMIN — ENOXAPARIN SODIUM 40 MG: 40 INJECTION SUBCUTANEOUS at 10:00

## 2020-01-01 RX ADMIN — ASPIRIN 81 MG 81 MG: 81 TABLET ORAL at 09:44

## 2020-01-01 RX ADMIN — LEVOTHYROXINE SODIUM 50 MCG: 50 TABLET ORAL at 05:21

## 2020-01-01 RX ADMIN — DEXTROSE MONOHYDRATE: 50 INJECTION, SOLUTION INTRAVENOUS at 06:08

## 2020-01-01 RX ADMIN — CLOTRIMAZOLE 10 MG: 10 LOZENGE ORAL; TOPICAL at 19:58

## 2020-01-01 RX ADMIN — VITAMIN D, TAB 1000IU (100/BT) 1000 UNITS: 25 TAB at 09:01

## 2020-01-01 RX ADMIN — ENOXAPARIN SODIUM 40 MG: 40 INJECTION SUBCUTANEOUS at 08:29

## 2020-01-01 RX ADMIN — SODIUM CHLORIDE, PRESERVATIVE FREE 100 UNITS: 5 INJECTION INTRAVENOUS at 22:27

## 2020-01-01 RX ADMIN — ENOXAPARIN SODIUM 40 MG: 40 INJECTION SUBCUTANEOUS at 09:37

## 2020-01-01 RX ADMIN — METFORMIN HYDROCHLORIDE 1000 MG: 1000 TABLET, FILM COATED ORAL at 09:10

## 2020-01-01 RX ADMIN — DEXTROSE MONOHYDRATE: 50 INJECTION, SOLUTION INTRAVENOUS at 02:29

## 2020-01-01 RX ADMIN — ACETAMINOPHEN 650 MG: 325 TABLET ORAL at 21:50

## 2020-01-01 RX ADMIN — VITAMIN D, TAB 1000IU (100/BT) 1000 UNITS: 25 TAB at 10:00

## 2020-01-01 RX ADMIN — CLOTRIMAZOLE 10 MG: 10 LOZENGE ORAL; TOPICAL at 15:32

## 2020-01-01 RX ADMIN — Medication 10 ML: at 21:03

## 2020-01-01 RX ADMIN — CLOTRIMAZOLE 10 MG: 10 LOZENGE ORAL; TOPICAL at 17:26

## 2020-01-01 RX ADMIN — POTASSIUM & SODIUM PHOSPHATES POWDER PACK 280-160-250 MG 250 MG: 280-160-250 PACK at 10:32

## 2020-01-01 RX ADMIN — SODIUM CHLORIDE, PRESERVATIVE FREE 100 UNITS: 5 INJECTION INTRAVENOUS at 10:12

## 2020-01-01 RX ADMIN — CLOTRIMAZOLE 10 MG: 10 LOZENGE ORAL; TOPICAL at 17:10

## 2020-01-01 RX ADMIN — LISINOPRIL 10 MG: 10 TABLET ORAL at 09:11

## 2020-01-01 RX ADMIN — LISINOPRIL 10 MG: 10 TABLET ORAL at 08:51

## 2020-01-01 RX ADMIN — LISINOPRIL 10 MG: 10 TABLET ORAL at 10:18

## 2020-01-01 RX ADMIN — Medication 10 ML: at 09:43

## 2020-01-01 RX ADMIN — Medication 10 ML: at 10:13

## 2020-01-01 RX ADMIN — CLOTRIMAZOLE 10 MG: 10 LOZENGE ORAL; TOPICAL at 16:40

## 2020-01-01 RX ADMIN — ASPIRIN 81 MG 81 MG: 81 TABLET ORAL at 10:18

## 2020-01-01 RX ADMIN — Medication 10 ML: at 10:36

## 2020-01-01 RX ADMIN — ENOXAPARIN SODIUM 40 MG: 40 INJECTION SUBCUTANEOUS at 08:31

## 2020-01-01 RX ADMIN — METFORMIN HYDROCHLORIDE 1000 MG: 1000 TABLET, FILM COATED ORAL at 16:48

## 2020-01-01 RX ADMIN — CLOPIDOGREL BISULFATE 75 MG: 75 TABLET ORAL at 10:18

## 2020-01-01 RX ADMIN — LISINOPRIL 10 MG: 10 TABLET ORAL at 09:44

## 2020-01-01 RX ADMIN — LEVOTHYROXINE SODIUM 50 MCG: 50 TABLET ORAL at 04:55

## 2020-01-01 RX ADMIN — SODIUM CHLORIDE, PRESERVATIVE FREE 10 ML: 5 INJECTION INTRAVENOUS at 09:18

## 2020-01-01 RX ADMIN — Medication 10 ML: at 20:13

## 2020-01-01 RX ADMIN — Medication 10 ML: at 02:13

## 2020-01-01 RX ADMIN — ATORVASTATIN CALCIUM 40 MG: 40 TABLET, FILM COATED ORAL at 09:56

## 2020-01-01 RX ADMIN — VITAMIN D, TAB 1000IU (100/BT) 1000 UNITS: 25 TAB at 08:04

## 2020-01-01 RX ADMIN — CLOTRIMAZOLE 10 MG: 10 LOZENGE ORAL; TOPICAL at 08:39

## 2020-01-01 RX ADMIN — Medication 10 ML: at 19:58

## 2020-01-01 RX ADMIN — Medication 10 ML: at 09:15

## 2020-01-01 RX ADMIN — Medication 10 ML: at 09:26

## 2020-01-01 RX ADMIN — LEVOTHYROXINE SODIUM 50 MCG: 50 TABLET ORAL at 05:46

## 2020-01-01 RX ADMIN — MAGNESIUM SULFATE HEPTAHYDRATE 1 G: 1 INJECTION, SOLUTION INTRAVENOUS at 11:05

## 2020-01-01 RX ADMIN — CLOTRIMAZOLE 10 MG: 10 LOZENGE ORAL; TOPICAL at 16:38

## 2020-01-01 RX ADMIN — ASPIRIN 81 MG 81 MG: 81 TABLET ORAL at 11:39

## 2020-01-01 RX ADMIN — DOCUSATE SODIUM 100 MG: 100 CAPSULE, LIQUID FILLED ORAL at 16:39

## 2020-01-01 RX ADMIN — CLOTRIMAZOLE 10 MG: 10 LOZENGE ORAL; TOPICAL at 05:42

## 2020-01-01 RX ADMIN — CLOTRIMAZOLE 10 MG: 10 LOZENGE ORAL; TOPICAL at 00:30

## 2020-01-01 RX ADMIN — CLOTRIMAZOLE 10 MG: 10 LOZENGE ORAL; TOPICAL at 15:42

## 2020-01-01 RX ADMIN — SODIUM CHLORIDE, PRESERVATIVE FREE 100 UNITS: 5 INJECTION INTRAVENOUS at 09:15

## 2020-01-01 RX ADMIN — DOCUSATE SODIUM 100 MG: 100 CAPSULE, LIQUID FILLED ORAL at 09:42

## 2020-01-01 RX ADMIN — LEVOFLOXACIN 250 MG: 250 TABLET, FILM COATED ORAL at 16:58

## 2020-01-01 RX ADMIN — WATER 1 G: 1 INJECTION INTRAMUSCULAR; INTRAVENOUS; SUBCUTANEOUS at 16:01

## 2020-01-01 RX ADMIN — LISINOPRIL 10 MG: 10 TABLET ORAL at 08:29

## 2020-01-01 RX ADMIN — METFORMIN HYDROCHLORIDE 1000 MG: 1000 TABLET, FILM COATED ORAL at 17:55

## 2020-01-01 RX ADMIN — CLOTRIMAZOLE 10 MG: 10 LOZENGE ORAL; TOPICAL at 16:54

## 2020-01-01 RX ADMIN — DOCUSATE SODIUM 100 MG: 100 CAPSULE, LIQUID FILLED ORAL at 10:32

## 2020-01-01 RX ADMIN — CLOTRIMAZOLE 10 MG: 10 LOZENGE ORAL; TOPICAL at 20:30

## 2020-01-01 RX ADMIN — METFORMIN HYDROCHLORIDE 1000 MG: 1000 TABLET, FILM COATED ORAL at 17:12

## 2020-01-01 RX ADMIN — LEVOTHYROXINE SODIUM 50 MCG: 50 TABLET ORAL at 06:39

## 2020-01-01 RX ADMIN — ACETAMINOPHEN 650 MG: 325 TABLET ORAL at 21:14

## 2020-01-01 RX ADMIN — ASPIRIN 81 MG 81 MG: 81 TABLET ORAL at 16:48

## 2020-01-01 RX ADMIN — DEXTROSE MONOHYDRATE: 50 INJECTION, SOLUTION INTRAVENOUS at 20:04

## 2020-01-01 RX ADMIN — LEVOTHYROXINE SODIUM 50 MCG: 50 TABLET ORAL at 06:05

## 2020-01-01 RX ADMIN — ENOXAPARIN SODIUM 40 MG: 40 INJECTION SUBCUTANEOUS at 10:18

## 2020-01-01 RX ADMIN — ENOXAPARIN SODIUM 40 MG: 40 INJECTION SUBCUTANEOUS at 09:26

## 2020-01-01 RX ADMIN — METFORMIN HYDROCHLORIDE 1000 MG: 1000 TABLET, FILM COATED ORAL at 17:33

## 2020-01-01 RX ADMIN — ATORVASTATIN CALCIUM 40 MG: 40 TABLET, FILM COATED ORAL at 10:32

## 2020-01-01 RX ADMIN — LEVOFLOXACIN 250 MG: 250 TABLET, FILM COATED ORAL at 08:26

## 2020-01-01 RX ADMIN — SODIUM CHLORIDE, PRESERVATIVE FREE 100 UNITS: 5 INJECTION INTRAVENOUS at 10:01

## 2020-01-01 RX ADMIN — HYDROXYCHLOROQUINE SULFATE 400 MG: 200 TABLET, FILM COATED ORAL at 10:35

## 2020-01-01 RX ADMIN — DOCUSATE SODIUM 100 MG: 100 CAPSULE, LIQUID FILLED ORAL at 08:29

## 2020-01-01 RX ADMIN — VITAMIN D, TAB 1000IU (100/BT) 1000 UNITS: 25 TAB at 08:51

## 2020-01-01 RX ADMIN — ATORVASTATIN CALCIUM 40 MG: 40 TABLET, FILM COATED ORAL at 09:25

## 2020-01-01 RX ADMIN — HYDROXYCHLOROQUINE SULFATE 200 MG: 200 TABLET, FILM COATED ORAL at 09:44

## 2020-01-01 RX ADMIN — VITAMIN D, TAB 1000IU (100/BT) 1000 UNITS: 25 TAB at 08:27

## 2020-01-01 RX ADMIN — CLOTRIMAZOLE 10 MG: 10 LOZENGE ORAL; TOPICAL at 23:03

## 2020-01-01 RX ADMIN — ENOXAPARIN SODIUM 40 MG: 40 INJECTION SUBCUTANEOUS at 17:16

## 2020-01-01 RX ADMIN — CLOTRIMAZOLE 10 MG: 10 LOZENGE ORAL; TOPICAL at 23:50

## 2020-01-01 RX ADMIN — VITAMIN D, TAB 1000IU (100/BT) 1000 UNITS: 25 TAB at 09:55

## 2020-01-01 RX ADMIN — ENOXAPARIN SODIUM 40 MG: 40 INJECTION SUBCUTANEOUS at 09:42

## 2020-01-01 RX ADMIN — Medication 10 ML: at 10:01

## 2020-01-01 RX ADMIN — CLOTRIMAZOLE 10 MG: 10 LOZENGE ORAL; TOPICAL at 21:48

## 2020-01-01 RX ADMIN — ASPIRIN 81 MG 81 MG: 81 TABLET ORAL at 10:35

## 2020-01-01 RX ADMIN — VITAMIN D, TAB 1000IU (100/BT) 1000 UNITS: 25 TAB at 09:26

## 2020-01-01 RX ADMIN — METFORMIN HYDROCHLORIDE 1000 MG: 1000 TABLET, FILM COATED ORAL at 17:04

## 2020-01-01 RX ADMIN — CLOTRIMAZOLE 10 MG: 10 LOZENGE ORAL; TOPICAL at 09:14

## 2020-01-01 RX ADMIN — HYDROXYCHLOROQUINE SULFATE 200 MG: 200 TABLET, FILM COATED ORAL at 21:03

## 2020-01-01 RX ADMIN — METFORMIN HYDROCHLORIDE 1000 MG: 1000 TABLET, FILM COATED ORAL at 08:28

## 2020-01-01 RX ADMIN — CLOPIDOGREL BISULFATE 75 MG: 75 TABLET ORAL at 10:00

## 2020-01-01 RX ADMIN — ASPIRIN 81 MG 81 MG: 81 TABLET ORAL at 09:16

## 2020-01-01 RX ADMIN — ATORVASTATIN CALCIUM 40 MG: 40 TABLET, FILM COATED ORAL at 08:26

## 2020-01-01 RX ADMIN — Medication 10 ML: at 20:05

## 2020-01-01 RX ADMIN — LISINOPRIL 10 MG: 10 TABLET ORAL at 08:28

## 2020-01-01 RX ADMIN — HYDROXYCHLOROQUINE SULFATE 200 MG: 200 TABLET, FILM COATED ORAL at 20:29

## 2020-01-01 RX ADMIN — LEVOTHYROXINE SODIUM 50 MCG: 50 TABLET ORAL at 05:44

## 2020-01-01 RX ADMIN — CLOPIDOGREL BISULFATE 75 MG: 75 TABLET ORAL at 10:32

## 2020-01-01 RX ADMIN — VITAMIN D, TAB 1000IU (100/BT) 1000 UNITS: 25 TAB at 09:42

## 2020-01-01 RX ADMIN — ENOXAPARIN SODIUM 40 MG: 40 INJECTION SUBCUTANEOUS at 08:27

## 2020-01-01 ASSESSMENT — PAIN SCALES - WONG BAKER
WONGBAKER_NUMERICALRESPONSE: 0
WONGBAKER_NUMERICALRESPONSE: 4
WONGBAKER_NUMERICALRESPONSE: 0
WONGBAKER_NUMERICALRESPONSE: 0
WONGBAKER_NUMERICALRESPONSE: 4
WONGBAKER_NUMERICALRESPONSE: 4
WONGBAKER_NUMERICALRESPONSE: 0

## 2020-01-01 ASSESSMENT — PAIN SCALES - GENERAL
PAINLEVEL_OUTOF10: 0
PAINLEVEL_OUTOF10: 4
PAINLEVEL_OUTOF10: 0
PAINLEVEL_OUTOF10: 4
PAINLEVEL_OUTOF10: 0
PAINLEVEL_OUTOF10: 4
PAINLEVEL_OUTOF10: 0
PAINLEVEL_OUTOF10: 4
PAINLEVEL_OUTOF10: 0

## 2020-01-01 ASSESSMENT — PAIN SCALES - PAIN ASSESSMENT IN ADVANCED DEMENTIA (PAINAD)
BODYLANGUAGE: 0
TOTALSCORE: 0
BODYLANGUAGE: 0
BODYLANGUAGE: 0
TOTALSCORE: 0
BODYLANGUAGE: 0
NEGVOCALIZATION: 0
BREATHING: 0
NEGVOCALIZATION: 0
NEGVOCALIZATION: 0
BREATHING: 0
NEGVOCALIZATION: 0
BODYLANGUAGE: 0
FACIALEXPRESSION: 0
FACIALEXPRESSION: 0
BODYLANGUAGE: 0
CONSOLABILITY: 0
FACIALEXPRESSION: 0
FACIALEXPRESSION: 0
CONSOLABILITY: 0
BODYLANGUAGE: 0
TOTALSCORE: 0
FACIALEXPRESSION: 0
NEGVOCALIZATION: 0
FACIALEXPRESSION: 0
BODYLANGUAGE: 0
BREATHING: 0
BREATHING: 1
TOTALSCORE: 0
CONSOLABILITY: 0
TOTALSCORE: 0
FACIALEXPRESSION: 0
FACIALEXPRESSION: 0
BREATHING: 0
FACIALEXPRESSION: 0
CONSOLABILITY: 0
TOTALSCORE: 0
CONSOLABILITY: 0
CONSOLABILITY: 0
BREATHING: 0
TOTALSCORE: 0
FACIALEXPRESSION: 0
BODYLANGUAGE: 0
NEGVOCALIZATION: 0
TOTALSCORE: 0
NEGVOCALIZATION: 0
BODYLANGUAGE: 0
TOTALSCORE: 0
CONSOLABILITY: 0
BODYLANGUAGE: 0
TOTALSCORE: 0
NEGVOCALIZATION: 0
BODYLANGUAGE: 0
CONSOLABILITY: 0
BODYLANGUAGE: 0
CONSOLABILITY: 0
FACIALEXPRESSION: 0
BREATHING: 0
NEGVOCALIZATION: 0
CONSOLABILITY: 0
FACIALEXPRESSION: 0
BREATHING: 0
FACIALEXPRESSION: 0
TOTALSCORE: 1
BREATHING: 0
NEGVOCALIZATION: 0
BREATHING: 0
NEGVOCALIZATION: 0
TOTALSCORE: 0
NEGVOCALIZATION: 0
BREATHING: 0
TOTALSCORE: 1
BREATHING: 0
BODYLANGUAGE: 0
BREATHING: 0
CONSOLABILITY: 0
NEGVOCALIZATION: 0
BREATHING: 1
TOTALSCORE: 0
FACIALEXPRESSION: 0
NEGVOCALIZATION: 0

## 2020-01-01 ASSESSMENT — ENCOUNTER SYMPTOMS
EYE PAIN: 0
COUGH: 1
DIARRHEA: 0
SINUS PRESSURE: 0
WHEEZING: 0
SHORTNESS OF BREATH: 1
ABDOMINAL PAIN: 0
EYE REDNESS: 0
SORE THROAT: 0
EYE DISCHARGE: 0
VOMITING: 0
BACK PAIN: 0
NAUSEA: 0

## 2020-01-01 ASSESSMENT — PAIN DESCRIPTION - DESCRIPTORS: DESCRIPTORS: ACHING

## 2020-01-01 ASSESSMENT — PAIN DESCRIPTION - LOCATION: LOCATION: GENERALIZED

## 2020-03-30 PROBLEM — J96.01 ACUTE RESPIRATORY FAILURE WITH HYPOXIA (HCC): Status: ACTIVE | Noted: 2020-01-01

## 2020-03-30 NOTE — ED PROVIDER NOTES
80-year-old male presents to the emergency department from home with cough shortness of breath hypoxia per EMS patient states has been sick for the last couple days. The patient's wife is also here for similar symptoms. Patient states he does wear 2 L of oxygen at home. EMS has him on 5 L here in the emergency department. Also is a history of hypertension and type 2 diabetes. Patient states no problems with nausea vomiting diarrhea abdominal pain. No other complaints at this time. The history is provided by the patient. Cough   Cough characteristics:  Productive  Sputum characteristics:  Nondescript  Severity:  Mild  Onset quality:  Gradual  Duration:  2 days  Timing:  Intermittent  Progression:  Waxing and waning  Chronicity:  New  Smoker: former smoker. Relieved by:  Nothing  Worsened by:  Nothing  Ineffective treatments:  None tried  Associated symptoms: shortness of breath    Associated symptoms: no chest pain, no chills, no ear pain, no eye discharge, no fever, no headaches, no rash, no sore throat and no wheezing    Risk factors: recent infection         Review of Systems   Constitutional: Negative for chills and fever. HENT: Negative for ear pain, sinus pressure and sore throat. Eyes: Negative for pain, discharge and redness. Respiratory: Positive for cough and shortness of breath. Negative for wheezing. Cardiovascular: Negative for chest pain. Gastrointestinal: Negative for abdominal pain, diarrhea, nausea and vomiting. Genitourinary: Negative for dysuria and frequency. Musculoskeletal: Negative for arthralgias and back pain. Skin: Negative for rash and wound. Neurological: Negative for weakness and headaches. Hematological: Negative for adenopathy. All other systems reviewed and are negative. Physical Exam  Constitutional:       Appearance: Normal appearance. He is ill-appearing. HENT:      Head: Normocephalic and atraumatic.       Mouth/Throat:      Mouth: infarction. Past Surgical History:  has a past surgical history that includes Cholecystectomy and Appendectomy. Social History:  reports that he quit smoking about 32 years ago. He has never used smokeless tobacco. He reports current alcohol use. He reports that he does not use drugs. Family History: family history includes Diabetes in his mother and sister. The patients home medications have been reviewed. Allergies: Patient has no known allergies.     -------------------------------------------------- RESULTS -------------------------------------------------    LABS:  Results for orders placed or performed during the hospital encounter of 03/30/20   Rapid influenza A/B antigens   Result Value Ref Range    Influenza A by PCR Not Detected Not Detected    Influenza B by PCR Not Detected Not Detected   Respiratory Panel, Molecular   Result Value Ref Range    Adenovirus by PCR Not Detected Not Detected    Bordetella parapertussis by PCR Not Detected Not Detected    Bordetella pertussis by PCR Not Detected Not Detected    Chlamydophilia pneumoniae by PCR Not Detected Not Detected    Coronavirus 229E by PCR Not Detected Not Detected    Coronavirus HKU1 by PCR Not Detected Not Detected    Coronavirus NL63 by PCR Not Detected Not Detected    Coronavirus OC43 by PCR Not Detected Not Detected    Human Metapneumovirus by PCR Not Detected Not Detected    Human Rhinovirus/Enterovirus by PCR Not Detected Not Detected    Influenza A by PCR Not Detected Not Detected    Influenza B by PCR Not Detected Not Detected    Mycoplasma pneumoniae by PCR Not Detected Not Detected    Parainfluenza Virus 1 by PCR Not Detected Not Detected    Parainfluenza Virus 2 by PCR Not Detected Not Detected    Parainfluenza Virus 3 by PCR Not Detected Not Detected    Parainfluenza Virus 4 by PCR Not Detected Not Detected    Respiratory Syncytial Virus by PCR Not Detected Not Detected   CBC Auto Differential   Result Value Ref Range    WBC telemetry  Patient's condition is stable.                Eliezer Marrero DO  03/30/20 1553

## 2020-03-30 NOTE — H&P
Historical Provider, MD   simvastatin (ZOCOR) 40 MG tablet Take 40 mg by mouth nightly   Yes Historical Provider, MD   Multiple Vitamins-Minerals (ICAPS AREDS 2 PO) Take 1 capsule by mouth 2 times daily     Historical Provider, MD   aspirin 81 MG tablet Take 81 mg by mouth every morning    Historical Provider, MD   vitamin D (CHOLECALCIFEROL) 1000 UNIT TABS tablet Take 1,000 Units by mouth every morning    Historical Provider, MD       Allergies:    Patient has no known allergies. Social History:    reports that he quit smoking about 32 years ago. He has never used smokeless tobacco. He reports current alcohol use. He reports that he does not use drugs. Unknown    Family History:   family history includes Diabetes in his mother and sister. Unknown      PHYSICAL EXAM:  Vitals:  /74   Pulse 85   Temp 98.3 °F (36.8 °C) (Oral)   Resp 16   Wt 188 lb 1.6 oz (85.3 kg)   SpO2 96%   BMI 26.23 kg/m²   General Appearance: alert and oriented to person. Confused to place and time. Follows commands  Skin: warm and dry  Head: normocephalic and atraumatic  Neck: neck supple and non tender without mass   Pulmonary/Chest:diminished throughout to auscultation. Old bruise on left chest area   Cardiovascular: normal rate, normal S1 and S2 and no carotid bruits  Abdomen: soft, non-tender, non-distended, normal bowel sounds  Extremities: no cyanosis, no clubbing and no edema  Neurologic:speech normal         LABS:  Recent Labs     03/30/20  1200      K 4.7      CO2 25   BUN 26*   CREATININE 0.9   GLUCOSE 212*   CALCIUM 8.8       Recent Labs     03/30/20  1200   WBC 6.0   RBC 3.84   HGB 11.9*   HCT 36.5*   MCV 95.1   MCH 31.0   MCHC 32.6   RDW 14.7      MPV 10.2       No results for input(s): POCGLU in the last 72 hours. Radiology:   CT Head WO Contrast   Final Result   Encephalomalacia on the left likely related to 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 Atrophy and probable chronic microvascular ischemic disease. Paranasal sinus disease. CT Chest WO Contrast   Final Result      1. Scattered pulmonary consolidative opacities in the lungs, most   notably in the left lower lobe, likely infectious in etiology. 2. Areas of calcification in the subpleural regions of the left lower   lobe consolidation is likely due to superimposed chronic scarring. 3. 1.5 cm nodular density in the left upper lobe, which may be   infectious. Neoplastic etiology cannot be excluded. Follow-up CT of   the chest recommended in 3 months. 4. Moderate to large hiatal hernia. ASSESSMENT:      Active Problems:    Acute respiratory failure with hypoxia (HCC)  Resolved Problems:    * No resolved hospital problems. *      PLAN:    1. Acute on chronic respiratory failure with hypoxia: patient wears 2 L at baseline per documentation. Patient reporting he only wears as needed at home. Currently on 4 L. Patient presents to the ER with complaints of cough, sob, hypoxia. CT chest showing scattered pulmonary consolidative opacities/ LLL - likely infectious. Need to rule out COVID 19. Rapid flu neg. Resp panel neg. Procal 0.15.     2. Pneumonia: Suspect viral- possible bacterial etiology. ID consulted in ER. COVID 19- rule out. Continue antibiotics. EKG-. Plaquenil started. Wife is currently admitted on  6W Covid rule out floor. 3. HTN: Bp elevated. Continue meds    4. H/o CVA on plavix    5. HLD: statin    6. DM: hga1c 6. 1. monitor bs ok. For metformin    7. Acute metabolic encephalopathy: patient appears confused. Unsure of mentation at baseline. Monitor     Code Status: FULL  DVT prophylaxis: lovenox      NOTE: This report was transcribed using voice recognition software. Every effort was made to ensure accuracy; however, inadvertent computerized transcription errors may be present.   Electronically signed by MARCO Perry on 3/30/2020 at 5:54 PM

## 2020-03-31 NOTE — PROGRESS NOTES
[x]   Therapeutic Activity [x]  Therapeutic Exercise  [x]  Visual/Perceptual: []    Delirium prevention/treatment  []   Other:  []    Rehab Potential: Guarded for established goals     Patient / Family Goal: Does not state goal.      Patient and/or family were instructed on functional diagnosis, prognosis/goals and OT plan of care. Pt verbalized poor understanding. Upon arrival, patient supine in bed. At end of session, patient supine in bed with call light and phone within reach, all lines and tubes intact. Pt would benefit from continued skilled OT to increase safety and independence with completion of ADL/IADL tasks for functional independence and quality of life. Bed/chair alarm: ON. Nursing notified of coughing with small sips of water through straw. No coughing with small sip without use of straw.      Low Evaluation + 10 timed treatment minutes  Treatment Time In: 1325   Treatment Time Out: 6544   Treatment Charges: Mins Units    ADL/Home Mgt 08225 3     Thera Activities 24907 7 1    Ther Ex 33960      Manual Therapy 23591      Neuro Re-ed 84817      Orthotic manage/training  91473      Non Billable Time      Total Timed Treatment 10 1          Evaluation time includes thorough review of current medical information, gathering information on past medical history/social history and prior level of function, completion of standardized testing/informal observation of tasks, assessment of data, and development of POC/Goals    Low Gray OTR/L  [id number]

## 2020-03-31 NOTE — CONSULTS
[address] [place] [address] [facility]    Consultation Note     Admit Date: 3/30/2020 11:48 AM    Reason for Consult:  lloyd    Attending Physician:  John Treviño DO  Cc- sob    History Obtained From:  For a detailed history please see previous and current available medical records. HISTORY OF PRESENT ILLNESS:             The patient is a 80 y.o. male admitted with SOB. He recently had his wife diagnosed to have COVID 19. except for dry cough he has no other symptoms. He was started on iv rocephin and plaquinil. Past Medical History:        Diagnosis Date    Arthritis     Diabetes mellitus ([address]     Hypertension     Unspecified cerebral artery occlusion with cerebral infarction      Past Surgical History:        Procedure Laterality Date    APPENDECTOMY      CHOLECYSTECTOMY       Current Medications:   Scheduled Meds:   cefTRIAXone (ROCEPHIN) IV  1 g Intravenous Q24H    hydroxychloroquine  200 mg Oral BID    aspirin  81 mg Oral QAM    clopidogrel  75 mg Oral QAM    levothyroxine  50 mcg Oral QAM AC    metFORMIN  1,000 mg Oral BID WC    atorvastatin  40 mg Oral Daily    Vitamin D  1,000 Units Oral QAM    lisinopril  10 mg Oral QAM    sodium chloride flush  10 mL Intravenous 2 times per day     Continuous Infusions:  PRN Meds:sodium chloride flush, acetaminophen **OR** acetaminophen, polyethylene glycol, promethazine **OR** ondansetron    Allergies:  Patient has no known allergies.     Social History:   Social History     Socioeconomic History    Marital status:      Spouse name: None    Number of children: None    Years of education: None    Highest education level: None   Occupational History    None   Social Needs    Financial resource strain: None    Food insecurity     Worry: None     Inability: None    Transportation needs     Medical: None     Non-medical: None   Tobacco Use    Smoking status: Former Smoker     Last attempt to quit: 2/22/1988     Years since quittin.1    Smokeless tobacco: Never Used   Substance and Sexual Activity    Alcohol use: Yes     Comment: occasional    Drug use: No    Sexual activity: Never   Lifestyle    Physical activity     Days per week: None     Minutes per session: None    Stress: None   Relationships    Social connections     Talks on phone: None     Gets together: None     Attends Hoahaoism service: None     Active member of club or organization: None     Attends meetings of clubs or organizations: None     Relationship status: None    Intimate partner violence     Fear of current or ex partner: None     Emotionally abused: None     Physically abused: None     Forced sexual activity: None   Other Topics Concern    None   Social History Narrative    None       Family History:       Problem Relation Age of Onset    Diabetes Mother     Diabetes Sister    . Otherwise non-pertinent to the chief complaint.     REVIEW OF SYSTEMS:    14 ROS negative unless otherwise specified in the HPI    PHYSICAL EXAM:    Vitals:    BP (!) 148/82   Pulse 88   Temp 101.1 °F (38.4 °C)   Resp 18   Ht 5' 10\" (1.778 m)   Wt 180 lb 8 oz (81.9 kg)   SpO2 96%   BMI 25.90 kg/m²     General Appearance:    Alert, cooperative, no distress, appears stated age   Head:    Normocephalic, without obvious abnormality, atraumatic   Eyes:    PERRL, conjunctiva/corneas clear      Ears:    Normal and external ear canals, both ears   Nose:   Nares normal, septum midline, mucosa normal, no drainage    or sinus tenderness   Throat:   Lips, mucosa, and tongue normal; teeth and gums normal   Neck:   Supple, trachea midline, no adenopathy; no JVD   Lungs:     Clear to auscultation bilaterally, respirations unlabored   Chest wall:    No tenderness or deformity   Heart:    Regular rate and rhythm, S1 and S2 normal, no murmur, rub   or gallop   Abdomen:     Soft, non-tender, bowel sounds active all four quadrants,     no masses, no organomegaly   Extremities:

## 2020-03-31 NOTE — PROGRESS NOTES
demonstrated skill Pt requires further education in this area   no         ASSESSMENT:      Treatment:  Patient practiced and was instructed in the following treatment:     Pt sat edge of bed x8 minutes working on sitting balance and core strenght. Sit <> stand transfers x3, pt unable to fully stand    Pt's/ family goals   1. Pt unable to state    Patient and or family understand(s) diagnosis, prognosis, and plan of care. no    PLAN:    PT care will be provided in accordance with the objectives noted above. Exercises and functional mobility practice will be used as well as appropriate assistive devices or modalities to obtain goals. Patient and family education will also be administered as needed. Frequency of treatments: 2-5x/week x 5.days    Time in  110  Time out  139    Total Treatment Time  12 minutes     Evaluation Time includes thorough review of current medical information, gathering information on past medical history/social history and prior level of function, completion of standardized testing/informal observation of tasks, assessment of data and education on plan of care and goals.     CPT codes:  [x] Low Complexity PT evaluation 99590  [] Moderate Complexity PT evaluation 68638  [] High Complexity PT evaluation 61416  [] PT Re-evaluation 56531  [] Gait training 40540 minutes  [] Manual therapy 50725 minutes  [x] Therapeutic activities 53547 12 minutes  [] Therapeutic exercises 34092 minutes  [] Neuromuscular reeducation 07982 minutes     Makeda Quick PT [id number]

## 2020-03-31 NOTE — CARE COORDINATION
Spoke to patient's son Otto Han via phone,  introduced myself and explained my role as RN case manager. Otto Han stated that his father lives independently with his wife. Otto Han notes that his mother is also admitted currently and being tested for Covid-19. Otto Han stated that his father has been very weak and unable to care for himself lately. He stated that his father has a cane and walker, however he has been falling frequently and unable to ambulate and care for himself. Otto Han stated that his mother suffered a fall attempting to assist the patient the other day at home. Otto Han expressed much concern about the patient returning home upon discharge. Informed Otto Han that due to the patient's pending Covid-19, discharge planning will be dependent upon the results. Time verbalized understanding. Otto Han stated that he would ultimately like his father to transition to [facility] if able, choosing 1) [facility] [place], 2) [facility] [place]. Referral made to [facility] [place]- they do not accept pt's insurance. Referral made to [facility] at [address] response. Otto Han also noted that his father has a history with [facility] in the past and stated that his father does not have have home oxygen.      Informed Otto Han that case management and social work will continue to follow to assist with the transition of care

## 2020-03-31 NOTE — PROGRESS NOTES
[facility] Progress Note    Admitting Date and Time: 3/30/2020 11:48 AM  Admit Dx: Acute respiratory failure with hypoxia (HCC) [J96.01]  Acute respiratory failure with hypoxia (HCC) [J96.01]    Subjective:  Patient is being followed for Acute respiratory failure with hypoxia ([address] [J96.01]  Acute respiratory failure with hypoxia ([address] [J96.01]     Patient resting in bed in no acute distress  Denies sob  Denies pain  Reporting he feels \" ok\"  No complaints  Ongoing confusion noted. T 101.2 overnight     ROS: denies fever, chills, cp, sob, n/v, HA unless stated above.  magnesium sulfate  2 g Intravenous Once    cefTRIAXone (ROCEPHIN) IV  1 g Intravenous Q24H    hydroxychloroquine  400 mg Oral BID    hydroxychloroquine  200 mg Oral BID    aspirin  81 mg Oral QAM    clopidogrel  75 mg Oral QAM    levothyroxine  50 mcg Oral QAM AC    metFORMIN  1,000 mg Oral BID WC    atorvastatin  40 mg Oral Daily    Vitamin D  1,000 Units Oral QAM    lisinopril  10 mg Oral QAM    sodium chloride flush  10 mL Intravenous 2 times per day     sodium chloride flush, 10 mL, PRN  acetaminophen, 650 mg, Q6H PRN    Or  acetaminophen, 650 mg, Q6H PRN  polyethylene glycol, 17 g, Daily PRN  promethazine, 12.5 mg, Q6H PRN    Or  ondansetron, 4 mg, Q6H PRN         Objective:    BP (!) 165/90   Pulse 78   Temp 98.3 °F (36.8 °C) (Oral)   Resp 18   Ht 5' 10\" (1.778 m)   Wt 180 lb 8 oz (81.9 kg)   SpO2 98%   BMI 25.90 kg/m²      General Appearance: alert and oriented to person. Confused to place and time. Follows commands  Skin: warm and dry  Head: normocephalic and atraumatic  Neck: neck supple and non tender without mass   Pulmonary/Chest:diminished throughout to auscultation.  Old bruise on left chest area   Cardiovascular: normal rate, normal S1 and S2 and no carotid bruits  Abdomen: soft, non-tender, non-distended, normal bowel sounds  Extremities: no cyanosis, no clubbing and no

## 2020-04-01 NOTE — PLAN OF CARE
Problem: Falls - Risk of:  Goal: Will remain free from falls  Description: Will remain free from falls  4/1/2020 1250 by Shanae Irving RN  Outcome: Met This Shift  4/1/2020 0030 by Stew Mercer RN  Outcome: Met This Shift  Goal: Absence of physical injury  Description: Absence of physical injury  4/1/2020 1250 by Shanae Irving RN  Outcome: Met This Shift  4/1/2020 0030 by Stew Mercer RN  Outcome: Met This Shift

## 2020-04-01 NOTE — PROGRESS NOTES
ABd/ADd and SAQ's A/AAROM x 20    Patient education  Pt was educated on exercise, sitting balance    Patient response to education:   Pt verbalized understanding Pt demonstrated skill Pt requires further education in this area   yes With instruction yes     ASSESSMENT:   Comments: Nurse ok with rx, states Pt on room air, wants 02 saturation monitored all rx. Pt found in bed, exercise performed, with good Pt participation, 02 saturation 92% and above during this. Pt agreed to sit EOB, sat EOB 6 minutes with Min A for balance, noted pushing to R side. 02 saturation 89% and above while EOB. Pt then assisted back to bed. Treatment: Pt practiced and was instructed in the following treatment: exercise promoting circulation and strengthening, UE usage to assist with sitting EOB     Pt was left in bed with call light in reach. Chair/bed alarm: bed alarm active    Time in 1444   Time out 1515   Total Treatment Time 31 minutes   CPT codes:     Therapeutic activities 58122 13 minutes   Therapeutic exercises 58274 18 minutes       Pt is making good progress toward established Physical Therapy goals as per improved sitting balance, participation with bed mobility and exercise participation. Continue with physical therapy current plan of care.     Brenda Boast PTA   License Number: [id number]

## 2020-04-03 NOTE — PROGRESS NOTES
2-/5  Distally: 3-/5   B UE: 3+/5       Comments:  Pt returned to bed at end of the session. Education/treatment:  ADL retraining with facilitation of movement to increase self care skills. Therapeutic activity to address balance, strength, and endurance for increased independence with ADL and transfers. Pt education of transfer safety and energy conservation. · Pt has made  progress towards set goals.    · Continue with current plan of care        Treatment Charges: Mins Units    ADL/Home Mgt 07656 10 1    Thera Activities 20852 17 1    Ther Ex 72333      Manual Therapy 96746      Neuro Re-ed 04996      Orthotic manage/training  42551      Non Billable Time      Total Timed Treatment 27 1287 [facility] [place]/L [address]45

## 2020-04-03 NOTE — PROGRESS NOTES
[address] [place] [address] [facility]  Progress Note    SUBJECTIVE:  CC: r/o COVID    Patient is tolerating medications. No reported adverse drug reactions. On room air saturating 92%  Confused. Denies sob, occasional cough, nonproductive. Denies pain, n/v/d. Review of systems:  As stated above in the chief complaint, otherwise negative. Medications:  Scheduled Meds:   enoxaparin  40 mg Subcutaneous Daily    cefTRIAXone (ROCEPHIN) IV  1 g Intravenous Q24H    hydroxychloroquine  200 mg Oral BID    aspirin  81 mg Oral QAM    clopidogrel  75 mg Oral QAM    levothyroxine  50 mcg Oral QAM AC    metFORMIN  1,000 mg Oral BID WC    atorvastatin  40 mg Oral Daily    Vitamin D  1,000 Units Oral QAM    lisinopril  10 mg Oral QAM    sodium chloride flush  10 mL Intravenous 2 times per day     Continuous Infusions:  PRN Meds:sodium chloride flush, acetaminophen **OR** acetaminophen, polyethylene glycol, promethazine **OR** ondansetron    OBJECTIVE:  /74   Pulse 69   Temp 98.2 °F (36.8 °C) (Axillary)   Resp 20   Ht 5' 10\" (1.778 m)   Wt 179 lb 6.4 oz (81.4 kg)   SpO2 92%   BMI 25.74 kg/m²   Temp  Av.9 °F (36.6 °C)  Min: 97.6 °F (36.4 °C)  Max: 98.2 °F (36.8 °C)  Constitutional: The patient is awake, alert, confused. Lying in bed in NAD  Skin: Warm and dry. No rashes were noted. HEENT: Round and reactive pupils. dry mucous membranes. No ulcerations or thrush. Neck: Supple to movements. Chest: No use of accessory muscles to breathe. Symmetrical expansion. No wheezing, crackles or rhonchi. Cardiovascular: S1 and S2 are rhythmic and regular. No murmurs appreciated. Abdomen: Positive bowel sounds to auscultation. Benign to palpation. Extremities: No clubbing, no cyanosis, no edema.   Lines: peripheral    Laboratory and Tests Review:  Lab Results   Component Value Date    WBC 4.8 2020    WBC 3.3 (L) 2020    WBC 6.2 2020    HGB 13.3 2020    HCT 41.0

## 2020-04-03 NOTE — CARE COORDINATION
4/3/2020  Social Work Discharge Planning:Awaiting COVID-19 results for possible acceptance at [facility]. Cannot take if positive COVID. AM PAC 6/24. If they accept they will need therapy evals but no need to wait for precert. N-17 generated, hens completed and transport form is in chart. Electronically signed by MERRILL Coronado on 4/3/2020 at 9:48 AM

## 2020-04-03 NOTE — PROGRESS NOTES
Occupational Therapy  OT BEDSIDE TREATMENT NOTE      Date:4/3/2020  Patient Name: Leonie Piña  MRN: [id number]  : 1932  Room: [address] [place] [address]     Referring Provider: Lj Kaur MD     Evaluating OT: Tashi Sarabia OTR/L [id number]     Pt okay for therapy per nursing 4/3/20     AM-PAC Daily Activity Raw Score: 14     Recommended Adaptive Equipment: continue to assess      Diagnosis: acute respiratory failure with hypoxia. Pertinent Medical History: CVA, HTN, DM, arthritis   Precautions:  Falls, O2, droplet plus isolation COVID-19 rule out     Home Living: Pt is a poor historian, unable to provide PLOF. Per medical chart pt lives with wife who is also currently hospitalized. Per son pt has been using [employer] and cane with recent falls at home. Unknown PLOF in ADL/IADLs.    Pain Level:  Pt did not complain of pain      Cognition: Alert and grossly oriented. Functional Assessment:    Initial Eval Status  Date: 3/31/20 Treatment session:  Short Term Goals  Treatment frequency: 2-5x/wk PRN x1-3 wks      Feeding Dependent  Holding cup to drink for sip of water  SBA   Grooming Dependent Mod A   Min A   UB Dressing Dependent  Management of hospital gown   Min A   LB Dressing Dependent  Donning B socks Max A  don slipper socks Mod A    Bathing Dependent   Mod A   Toileting Dependent Max A hygiene after incontinence of bowel  Mod A   Bed Mobility  Supine <> sit: Dependent Max A supine to sit       Functional Transfers STS: Dependent  Scooting to HOB: Dependent    Min A x2 sit to stand  Mod A   Functional Mobility Unable to step  pt able to take a few steps forward and back with min A HHA x2. Mod A during ADLs   Balance Sitting: fair/fair minus sitting balance   Sitting EOB x4-5 min      Standing: poor Fair- stand balance during ADL.        Activity Tolerance Poor  4L O2 throughout session Poor +   Fatigue and SOB however O2 saturation 90-92% during exertion.   standing ron x3-4 min with fair minus balance during self care tasks   B UE  R UE: proximally: 1/5  Distally: 2-/5     L UE: proximally: 2-/5  Distally: 3-/5   B UE: 3+/5       Comments:  Pt returned to bed at end of the session. Education/treatment:  ADL retraining with facilitation of movement to increase self care skills. Therapeutic activity to address balance, strength, and endurance for increased independence with ADL and transfers. Pt education of transfer safety and energy conservation. · Pt has made  progress towards set goals.    · Continue with current plan of care        Treatment Charges: Mins Units    ADL/Home Mgt 15242 8 1    Thera Activities 61833 17 1    Ther Ex 86071      Manual Therapy 73880      Neuro Re-ed 29864      Orthotic manage/training  03757      Non Billable Time      Total Timed Treatment 25 1287 [facility] ODESSA/L 10812

## 2020-04-03 NOTE — DISCHARGE INSTR - COC
restirctions  Other Medical Equipment (for information only, NOT a DME order):  hospital bed  Other Treatments: ***    Patient's personal belongings (please select all that are sent with patient):  {P DME Belongings:397381529}    RN SIGNATURE:  {Esignature:453742917}    CASE MANAGEMENT/SOCIAL WORK SECTION    Inpatient Status Date: ***    Readmission Risk Assessment Score:  Readmission Risk              Risk of Unplanned Readmission:        10           Discharging to Facility/ Agency   · [facility]: [facility]   · Address:[address] [address]  · UENSY:[phone]  · Fax:[phone]    Dialysis Facility (if applicable)   · Name:  · Address:  · Dialysis Schedule:  · Phone:  · Fax:    / signature: Electronically signed by MERRILL Moses on 4/3/2020 at 10:49 AM      PHYSICIAN SECTION    Prognosis: {Prognosis:0474677474}    Condition at Discharge: [address] Max Patient Condition:199861527}    Rehab Potential (if transferring to Rehab): {Prognosis:5252196745}    Recommended Labs or Other Treatments After Discharge: ***    Physician Certification: I certify the above information and transfer of Luisa Pineda  is necessary for the continuing treatment of the diagnosis listed and that he requires skilled snf for {LESS:191880047} 30 days.      Update Admission H&P: {CHP DME Changes in BSRSI:686928696}    PHYSICIAN SIGNATURE:  {Esignature:528723567}

## 2020-04-03 NOTE — PROGRESS NOTES
heel slides, hip ABd/ADd A/AAROM x 20    Patient education  Pt was educated on exercise, transfers    Patient response to education:   Pt verbalized understanding Pt demonstrated skill Pt requires further education in this area   yes With instruction yes     ASSESSMENT:   Comments: Nurse ok with rx. Pt assisted to EOB, sat EOB SBA, stood off EOB approx 90 seconds with Min A for balance. Pt required hands on assist to step forward/backwards, was unsteady with this, unsafe to attempt this on his own. Exercise performed supine in bed. Pt fatigued after activity. 02 saturation 90% and above all activity on room air. Treatment: Pt practiced and was instructed in the following treatment: exercise promoting circulation and strengthening, LE participation with transfers. Pt was left in bed with call light in reach. Chair/bed alarm: bed alarm active    Time in 1350   Time out 1423   Total Treatment Time 33 minutes   CPT codes:     Therapeutic activities 56046 20 minutes   Therapeutic exercises 26953 13 minutes       Pt is making good progress toward established Physical Therapy goals as per ability to stand and step forward/backwards. Fair progress with exercise. Continue with physical therapy current plan of care promoting increased OOB activity as able.     Brianda Sin PTA   License Number: [id number]

## 2020-04-04 NOTE — PROGRESS NOTES
treatment   On metformin   Follow sugars   Stable     #4 Hypertension   Continue present treatment   On lisinopril   Blood pressure stable today     #5 Hyperlipidemia   On atorvastatin     #6 History of stroke   On Plavix     Code status               - full  DVT prophylaxis       - Lovenox  Expected disposition - TBD; based on COVID testing   Sent 3/30   If testing positive --> home   If negative --> [facility] (will need neg x2)

## 2020-04-05 NOTE — PROGRESS NOTES
dry    Assessment and Plan  #1 Pneumonia   Uncertain etiology at present possible viral, rule covid 19   Infectious disease following   Currently on Plaquenil; Rocephin stopped   NCO2 as needed   Follow labs   ? aspiration - coughing w swallowing - will ask speech to see     #2 Acute on chronic hypoxic respiratory failure   Per chart was on 2 L of oxygen at home   Weaned oxygen as able - currently room air     #3 Diabetes   Continue present treatment   On metformin   Follow sugars   Stable     #4 Hypertension   Continue present treatment   On lisinopril   Blood pressure stable today     #5 Hyperlipidemia   On atorvastatin     #6 History of stroke   On Plavix     Code status               - full  DVT prophylaxis       - Lovenox  Expected disposition - TBD; based on COVID testing   Sent 3/30   If testing positive --> home   If negative --> [facility] (will need neg x2)

## 2020-04-06 NOTE — PROGRESS NOTES
the last 72 hours. No results for input(s): CKTOTAL, CKMB, TROPONINI in the last 72 hours. No results for input(s): BNP in the last 72 hours. Other diagnostic procedures:    Ct chest   1. Scattered pulmonary consolidative opacities in the lungs, most  notably in the left lower lobe, likely infectious in etiology. 2. Areas of calcification in the subpleural regions of the left lower  lobe consolidation is likely due to superimposed chronic scarring. 3. 1.5 cm nodular density in the left upper lobe, which may be  infectious. Neoplastic etiology cannot be excluded. Follow-up CT of  the chest recommended in 3 months. 4. Moderate to large hiatal hernia. Ct head   Encephalomalacia on the left likely related to 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 Atrophy and probable chronic microvascular ischemic disease. Paranasal sinus disease. No intake/output data recorded.     Intake/Output Summary (Last 24 hours) at 4/6/2020 1052  Last data filed at 4/6/2020 0604  Gross per 24 hour   Intake --   Output 730 ml   Net -730 ml     -----------------------------------------------------------------          Patient Active Problem List    Diagnosis Date Noted    Acute respiratory failure with hypoxia ([facility] 03/30/2020    COVID-19 virus infection     Cerebrovascular accident (CVA) ([facility]     Essential hypertension     Mixed hyperlipidemia     Hypothyroidism     Supraventricular extrasystoles     Type 2 diabetes mellitus without complication ([facility] 62/72/6791    Confusion state 02/23/2018    Sepsis ([facility] 02/22/2018          Assessment & Plan:     Pneumonia  -Uncertain etiology at present possible viral, rule covid 19  -Infectious disease following  -Currently on Plaquenil day 6-7   -Stopped Rocephin  -was weaned from Oxygen - per chart had oxygen at home 2 L - did not always wean   -Follow labs    Acute on chronic hypoxic respiratory failure  -Per chart was on 2 L of oxygen at home  -Weaned oxygen as able - currently Room air - holding

## 2020-04-07 NOTE — PROGRESS NOTES
04/07/2020    HCT 40.2 04/07/2020    MCV 92.4 04/07/2020     04/07/2020     Lab Results   Component Value Date    NEUTROABS 7.25 04/07/2020    NEUTROABS 6.83 04/06/2020    NEUTROABS 5.37 04/05/2020     No results found for: [facility]  Lab Results   Component Value Date    ALT 19 04/07/2020    AST 38 04/07/2020    ALKPHOS 109 04/07/2020    BILITOT 0.6 04/07/2020     Lab Results   Component Value Date     04/07/2020    K 3.0 04/07/2020     04/07/2020    CO2 22 04/07/2020    BUN 23 04/07/2020    CREATININE 0.9 04/07/2020    CREATININE 0.9 04/06/2020    CREATININE 0.8 04/05/2020    GFRAA >60 04/07/2020    LABGLOM >60 04/07/2020    GLUCOSE 136 04/07/2020    PROT 6.3 04/07/2020    LABALBU 3.1 04/07/2020    CALCIUM 8.6 04/07/2020    BILITOT 0.6 04/07/2020    ALKPHOS 109 04/07/2020    AST 38 04/07/2020    ALT 19 04/07/2020     Lab Results   Component Value Date    CRP 10.2 (H) 04/01/2020     Lab Results   Component Value Date    SEDRATE 33 (H) 03/30/2020     Radiology:      Microbiology:   Blood cx no growth  COVID 19 NEGATIVE  Resp viral panel neg  Influenza neg  Strep pneumo neg      ASSESSMENT:  · Community acquired pneumonia-LLL pneumonia  · Contact with wife who had COVID 19- COVID testing negative     Plan:    · off plaquinil  · levaquin for 2 more days  · Continue isolation while in hospital, okay to discharge from ID POV  · if plan is to discharge up to facility will need repeat testing as the first test could have been false negative      Olga Wolf  3:31 PM  4/7/2020

## 2020-04-07 NOTE — CONSULTS
Consulted to rotate this patient's IV site. This patient is a difficult venous access patient and has had unsucessful many venipunctures. IV site of left lateral forearm is asymptomatic. It flushes well and pt denies any discomfort at site. IV site not rotated at this time. IV site dressing and securement device changed. Primary RN made aware of above.   Gilmar Jackson RN, CPUI, [facility]

## 2020-04-07 NOTE — CARE COORDINATION
4/7/2020  Social Work Discharge Planning:  [facility] able [facility]. Will need therapy evals but no need to wait for precert. N-17 generated, hens completed and transport form is in chart. SW notified son Patricia Felipe. Patricia Felipe would like to be notified of Pts discharge. Electronically signed by MERRILL Wray on 4/7/2020 at 11:39 AM

## 2020-04-07 NOTE — PROGRESS NOTES
during exertion.   standing ron x3-4 min with fair minus balance during self care tasks   B UE  R UE: proximally: 1/5  Distally: 2-/5     L UE: proximally: 2-/5  Distally: 3-/5   B UE: 3+/5       Comments:  Pt returned to bed at end of the session. Education/treatment:  ADL retraining with facilitation of movement to increase self care skills. Therapeutic activity to address balance, strength, and endurance for increased independence with ADL and transfers. Pt education of transfer safety and energy conservation. · Pt has made  progress towards set goals.    · Continue with current plan of care        Treatment Charges: Mins Units    ADL/Home Mgt 90439 9 1    Thera Activities 30630 17 1    Ther Ex 38792      Manual Therapy 62115      Neuro Re-ed 71423      Orthotic manage/training  38615      Non Billable Time      Total Timed Treatment 26 1287 [facility] ODESSA/L 32545

## 2020-04-07 NOTE — PROGRESS NOTES
4/7/2020  2:58 PM      Nutrition Assessment    Type and Reason for Visit: Initial    Nutrition Recommendations:  Continue current diet (per SLP recommendations) and ONS with all meals    Nutrition Assessment: Pt admit w/PNA. Hx of CVA, DM and current dysphagia noted. Will add ONS to meals, d/t suboptimal intake . Malnutrition Assessment:  · Malnutrition Status: At risk for malnutrition  · Context: Acute illness or injury  · Findings of the 6 clinical characteristics of malnutrition (Minimum of 2 out of 6 clinical characteristics is required to make the diagnosis of moderate or severe Protein Calorie Malnutrition based on AND/ASPEN Guidelines):  1. Energy Intake-Less than or equal to 50% of estimated energy requirement, Greater than or equal to 7 days    2. Weight Loss-No significant weight loss,    3. Fat Loss-Unable to assess,    4. Muscle Loss-Unable to assess,    5. Fluid Accumulation-No significant fluid accumulation,    6.  Strength-Not measured    Nutrition Risk Level:  Moderate    Nutrient Needs:  · Estimated Daily Total Kcal:  (MSJ x1.2)  · Estimated Daily Protein (g):  (1.2-1.4 g/kg)  · Estimated Daily Total Fluid (ml/day):  (1 ml/sammi)    Nutrition Diagnosis:   · Problem: Inadequate oral intake  · Etiology: related to Early satiety     Signs and symptoms:  as evidenced by Intake 25-50%    Objective Information:  · Nutrition-Focused Physical Findings: poor appetite, dysphagia, confused, trace edema, active BS, -I/O 2L  · Wound Type: (areas of blanchable redness and ecchymosis)  · Current Nutrition Therapies:  · Oral Diet Orders: Dental Soft, Moderately Thick   · Oral Diet intake: 1-25%  · Anthropometric Measures:  · Ht: 5' 10\" (177.8 cm)   · Current Body Wt: 172 lb (78 kg)(4/7 bedscale-down from admit w/-fluid balance)  · Admission Body Wt: 188 lb (85.3 kg)(3/30 actual)  · Usual Body Wt: (No recent EMR to confirm and pt poor historian)  · % Weight Change:  ,  unable to accurately

## 2020-04-08 NOTE — CARE COORDINATION
CASE MANAGEMENT. .. Notes reviewed from yesterday and today. Repeat COVID test from 4/7/2020 remains pended. Patient will discharge to [address] [place], [place] [address] once COVID test is resulted. Will cont to follow.

## 2020-04-08 NOTE — ADT AUTH CERT
po x2, colace po x1, lovenox sc x1, levaquin po x1, synthroid po x1, lisinopril po x, kcl po x1, vit po x1,

## 2020-04-08 NOTE — PROGRESS NOTES
[address] [place] [address] [facility]  Progress Note    SUBJECTIVE:  CC: r/o COVID    Patient is tolerating medications. No reported adverse drug reactions. On room air saturating 92%  Confused. Denies sob, occasional cough, nonproductive. Denies pain, n/v/d. Review of systems:  As stated above in the chief complaint, otherwise negative. Medications:  Scheduled Meds:   clotrimazole  10 mg Oral 5x Daily    levoFLOXacin  250 mg Oral Daily    docusate sodium  100 mg Oral Daily    enoxaparin  40 mg Subcutaneous Daily    aspirin  81 mg Oral QAM    clopidogrel  75 mg Oral QAM    levothyroxine  50 mcg Oral QAM AC    metFORMIN  1,000 mg Oral BID WC    atorvastatin  40 mg Oral Daily    Vitamin D  1,000 Units Oral QAM    lisinopril  10 mg Oral QAM    sodium chloride flush  10 mL Intravenous 2 times per day     Continuous Infusions:  PRN Meds:sodium chloride flush, acetaminophen **OR** acetaminophen, polyethylene glycol, promethazine **OR** ondansetron    OBJECTIVE:  /70   Pulse 72   Temp 96.1 °F (35.6 °C) (Oral)   Resp 16   Ht 5' 10\" (1.778 m)   Wt 173 lb 8 oz (78.7 kg)   SpO2 95%   BMI 24.89 kg/m²   Temp  Av.7 °F (35.9 °C)  Min: 95.7 °F (35.4 °C)  Max: 97.6 °F (36.4 °C)  Constitutional: The patient is awake, alert, confused. Lying in bed in NAD  Skin: Warm and dry. No rashes were noted. HEENT: Round and reactive pupils. dry mucous membranes. No ulcerations or thrush. Neck: Supple to movements. Chest: No use of accessory muscles to breathe. Symmetrical expansion. No wheezing, crackles or rhonchi. Cardiovascular: S1 and S2 are rhythmic and regular. No murmurs appreciated. Abdomen: Positive bowel sounds to auscultation. Benign to palpation. Extremities: No clubbing, no cyanosis, no edema.   Lines: peripheral    Laboratory and Tests Review:  Lab Results   Component Value Date    WBC 8.6 2020    WBC 7.8 2020    WBC 7.5 2020    HGB 12.6 2020 HCT 38.4 04/08/2020    MCV 92.1 04/08/2020     04/08/2020     Lab Results   Component Value Date    NEUTROABS 6.45 04/08/2020    NEUTROABS 7.25 04/07/2020    NEUTROABS 6.83 04/06/2020     No results found for: [facility]  Lab Results   Component Value Date    ALT 15 04/08/2020    AST 35 04/08/2020    ALKPHOS 111 04/08/2020    BILITOT 0.6 04/08/2020     Lab Results   Component Value Date     04/08/2020    K 3.6 04/08/2020     04/08/2020    CO2 20 04/08/2020    BUN 24 04/08/2020    CREATININE 0.8 04/08/2020    CREATININE 0.9 04/07/2020    CREATININE 0.9 04/06/2020    GFRAA >60 04/08/2020    LABGLOM >60 04/08/2020    GLUCOSE 131 04/08/2020    PROT 6.2 04/08/2020    LABALBU 3.0 04/08/2020    CALCIUM 8.9 04/08/2020    BILITOT 0.6 04/08/2020    ALKPHOS 111 04/08/2020    AST 35 04/08/2020    ALT 15 04/08/2020     Lab Results   Component Value Date    CRP 10.2 (H) 04/01/2020     Lab Results   Component Value Date    SEDRATE 33 (H) 03/30/2020     Radiology:      Microbiology:   Blood cx no growth  COVID 19 NEGATIVE  Resp viral panel neg  Influenza neg  Strep pneumo neg      ASSESSMENT:  · Community acquired pneumonia-LLL pneumonia  · Contact with wife who had COVID 19- COVID testing negative     Plan:    · off plaquinil  · levaquin for 2 more days  · Continue isolation while in hospital, okay to discharge from ID POV  · if plan is to discharge up to facility will need repeat testing as the first test could have been false negative      Dinorah Arrieta  5:42 PM  4/8/2020

## 2020-04-08 NOTE — PROGRESS NOTES
education  Pt was educated on exercise, transfers     Patient response to education:   Pt verbalized understanding Pt demonstrated skill Pt requires further education in this area   yes With instruction yes      Treatment :   Upon arrival, pt did not have O2 on. Pulse ox 94% at rest. Sat pt eOB  And pulse ox decreased to 88%. Placed pt on 2 LNC . After gait , pt 85% and was not recovering past this. O2 increased to 4 LNC and pt recovered to 91%. RN informed. limited activity tolerance. Unable to walk further .  Cues for hand placement with transfers  and posture with gait        Pt was left in bed with call light in reach.     Chair/bed alarm: bed alarm active     Time in 1430   Time out 1500  Total Treatment Time 25 minutes         Therapeutic activities 94312 25 minutes            Pt is making fair progress toward established Physical Therapy goals    Continue with physical therapy current plan of care      Neo Adames   PT 1359

## 2020-04-09 NOTE — PROGRESS NOTES
SPEECH LANGUAGE PATHOLOGY  DAILY PROGRESS NOTE        PATIENT NAME:  Cassie Falcon      :  1932          TODAY'S DATE:  2020 ROOM:  [address] [place], [place] [address]D      SWALLOWING    Pt in bed, placed upright. Alert and following commands well. Nursing reports inconsistent coughing with HTL. Discussed with pt who reports difficulty swallowing prior to this admit. Pt with hx of CVA. Pt accepted puree time  and HTL during session with overt s/s of aspiration observed with HTL this date. Marked decrease in white coating noted on lingual surface. DYSPHAGIA DIAGNOSIS:  Oropharyngeal dysphagia-clinical s/s of aspiration observed with thin, NTL, and HTL                             DIET RECOMMENDATIONS:  Dysphagia 2,  Mechanical Soft (Minced & Moist) solids with  pudding consistency (extremely thick) liquids as tolerated-NO STRAWS    May need nonoral feedings if poor oral intake continues. Hydration is also a concern now that pt is requiring pudding thick liquids.      Recommend increase in oral care and stand by assist for all meals. Staff to assist as needed and provide encouragement to increase oral intake due to reported poor intake.                  FEEDING RECOMMENDATIONS:                              Assistance level:  Stand by assistance is needed during all oral intake, staff to feed as needed                          Compensatory strategies recommended: Small bites/sips and Alternate solids and liquids, no straws        Oral- adequate labial seal and A-P transfer, no oral residue      Pharyngeal- delayed moist cough noted with HTL, no multiple swallows      Education- Pt educated on results and POC. Pt trained on compensatory strategies for safe swallow with good outcome. Questions answered. Will continue SP intervention as per previously established POC. Yanni JOHNSON CCC/SLP [id number]  Speech Pathologist                    CPT code(s) 82048  dysphagia tx  Total

## 2020-04-09 NOTE — PROGRESS NOTES
Physical Therapy  Facility/Department: [address] INTERNAL MEDICINE 2  Daily Treatment Note  NAME: Seamus Admas  : 1932  MRN: [id number]    Date of Service: 2020      Patient Diagnosis(es): The primary encounter diagnosis was COVID-19 virus infection. Diagnoses of Acute on chronic respiratory failure with hypoxia (HCC) and Suspected COVID-19 virus infection were also pertinent to this visit. has a past medical history of Arthritis, Diabetes mellitus ([address] Hypertension, and Unspecified cerebral artery occlusion with cerebral infarction. has a past surgical history that includes Cholecystectomy and Appendectomy.       Referring Provider: Lucrecia Ferrer MD        Evaluating Therapist: [facility] PT     Room #:416  DIAGNOSIS: Acute Respiratory failure  PRECAUTIONS: droplet plus isolation, fall, alarm, O2     Social:  Per chart pt lives with wife, however wife also in hospital. Per chart pt ambulates with cane or walker.  Pt unable to provide any information       Initial Evaluation  Date: 3/31/20 Treatment  2020    Short Term/ Long Term   Goals   Was pt agreeable to Eval/treatment? yes yes     Does pt have pain? No indication of pain No complaints     Bed Mobility  Rolling: dependent  Supine to sit: dependent  Sit to supine: dependent  Scooting: dependent Rolling: Mod A  Supine to sit: Max A  Sit to supine:  Mod A  Scooting: max assist sit  Mod assist   Transfers Sit to stand: dependent  Stand to sit: dependent  Stand pivot: dependent Mod assist  Mod assist   Ambulation    NT 5 feet forward/backwards with ww  Min/mod  A , same for side steps to [facility]     5 feet with AAD with mod assist   Stair Negotiation  Ascended and descended  NT    N/A   LE strength     3-/5     3/5   balance      Sitting balance SBA, standing balance poor       AM-PAC Raw score                          Balance: min /mod assist, high fall risk . posterior lean with stand and minimal gait         Patient education  Pt was educated on exercise, transfers     Patient response to education:   Pt verbalized understanding Pt demonstrated skill Pt requires further education in this area   yes With instruction yes      Treatment :   O2@ 5 LNC. Pulse ox 93% at rest. pulse ox decreased to 85% with mobility and recovered slowly to 91%. . Pt performed seated APs, LAQs , and hip flex while sitting EOB. Frequent cues to continue with exercises performance. limited activity tolerance. Unable to walk further .  Cues for hand placement with transfers  and posture with gait         Pt was left in bed with call light in reach.     Chair/bed alarm: bed alarm active     Time NB 7486  Time BAA 9687  Total Treatment Time 25 minutes         Therapeutic activities 28110 25 minutes            Pt is making fair progress toward established Physical Therapy goals    Continue with physical therapy current plan of care      Antonio Hernandez   PT 2610

## 2020-04-10 NOTE — ADT AUTH CERT
Pulmonary Disease GRG - Care Day 12 (4/10/2020) by Frank Alvarado RN         Review Status Review Entered   Completed 4/10/2020 14:30       Criteria Review      Care Day: 12 Care Date: 4/10/2020 Level of Care:    Guideline Day 3    Level Of Care    (X) * Activity level acceptable    ( ) * Isolation not needed, or status acceptable    Clinical Status    ( ) * Respiratory status acceptable    (X) * Right heart function adequate    (X) * Temperature status acceptable    (X) * No infection, or status acceptable    (X) * Stable chest findings    (X) * Pain and nausea absent or adequately managed    ( ) * General Discharge Criteria met    Interventions    (X) * No chest tube, or status acceptable    ( ) * Intake acceptable    ( ) * No inpatient interventions needed    * Milestone   Additional Notes   4/10--      Patient seen, discussed with RN staff, chart reviewed. No major changes overnight.  Continues to be hypoxic with minimal activity and on oxygen.  No complaints of chest pain or shortness of breath.  No abdominal pain or diarrhea.  no fever. Weak. Staff still reports poor oral intake. States thirsty. Oxygen 4 liters.        Monitor: sinus rhythm with pac        Objective:   Vitals: /80   Pulse 70   Temp 95.3 °F (35.2 °C) (Axillary)   Resp 20   Ht 5' 10\" (1.778 m)   Wt 166 lb 1.6 oz (75.3 kg)   SpO2 94%   BMI 23.83 kg/m²    General appearance: alert, appears stated age and cooperative   Skin: Skin color, texture, turgor normal. No rashes or lesions   HEENT: Head: Normocephalic, no lesions, without obvious abnormality. Eye: Normal external eye, conjunctiva, lids cornea, MARK.    Pharynx: not well visulaized -tongue improved   Neck: supple, symmetrical, trachea midline   Lungs: diminished breath sounds bilaterally   Heart: regular rate and rhythm, S1, S2 normal, no murmur, click, rub or gallop   Abdomen: soft, non-tender; bowel sounds normal; no masses,  no organomegaly   Extremities: extremities necessary treatment until an appropriate post-acute care facility can accept. WBC 9.3 E9/L      RBC 3.57 E12/L      Hemoglobin 10.7 g/dL      Hematocrit 33.5 %      MCV 93.8 fL      MCH 30.0 pg      MCHC 31.9 %      RDW 14.6 fL      Platelets 798 D0/D      MPV 10.4 fL      Neutrophils % 76.9 %      Immature Granulocytes % 4.3 %      Lymphocytes % 12.3 %      Monocytes % 5.8 %      Eosinophils % 0.4 %      Basophils % 0.3 %      Neutrophils Absolute 7.17 E9/L      Immature Granulocytes # 0.40 E9/L      Lymphocytes Absolute 1.15 E9/L      Monocytes Absolute 0.54 E9/L      Eosinophils Absolute 0.04 E9/L      Basophils Absolute 0.03 E9/L       Sodium 143 mmol/L      Potassium reflex Magnesium 4.2 mmol/L      Chloride 109 mmol/L      CO2 21 mmol/L      Anion Gap 13 mmol/L      Glucose 125 mg/dL      BUN 31 mg/dL      CREATININE 1.0 mg/dL      GFR Non-African American >60 mL/min/1.73      GFR African American >60     Calcium 8.6 mg/dL      Total Protein 5.9 g/dL      Alb 2.8 g/dL      Total Bilirubin 0.6 mg/dL      Alkaline Phosphatase 111 U/L      ALT 11 U/L      AST 31 U/L              COVID19 by Yesenia James RN         Review Status Review Entered   In Primary 4/8/2020 11:40       Criteria Review   The illness suspected to be related to the Coronavirus (COVID-19)? Other  Has the member been tested for the COVID-19? Yes   If Yes, what are the results of the COVID-19? Negative on 4/6   What is the severity of the members condition (i.e. Isolation, Ventilator use)? Cont droplet plus isolation on ICU stepdown unit, was weaned off, but now back on o2.  ID states that pt will need to be retested if planning to dc to SNF as the result could be a false negative

## 2020-04-10 NOTE — PROGRESS NOTES
Occupational Therapy  OT BEDSIDE TREATMENT NOTE      Date:4/10/2020  Patient Name: Brent Palencia  MRN: [id number]  : 1932  Room: [address] [place] [address]     Referring Provider: Elizabeth Martinez MD     Evaluating OT: Nena Samson OTR/L [id number]     Pt okay for therapy per nursing 20     AM-PAC Daily Activity Raw Score: 14     Recommended Adaptive Equipment: continue to assess      Diagnosis: acute respiratory failure with hypoxia. Pertinent Medical History: CVA, HTN, DM, arthritis   Precautions:  Falls, O2, droplet plus isolation      Home Living: Pt is a poor historian, unable to provide PLOF. Per medical chart pt lives with wife who is also currently hospitalized. Per son pt has been using [employer] and cane with recent falls at home. Unknown PLOF in ADL/IADLs.    Pain Level:  Pt denied having pain      Cognition: Alert and grossly oriented. Slow to respond to directions. Encouragement to increase active participation in activity. Functional Assessment:    Initial Eval Status  Date: 3/31/20 Treatment session:  Short Term Goals  Treatment frequency: 2-5x/wk PRN x1-3 wks      Feeding Dependent  Holding cup to drink for sip of water Wanting a drink however refusing to drink any thickened liquids. SBA   Grooming Dependent Mod A  wash face while seated on the EOB. Min A   UB Dressing Dependent  Management of hospital gown   Min A   LB Dressing Dependent  Donning B socks  Mod A    Bathing Dependent   Mod A   Toileting Dependent Max A tray hygiene after slight incontinence of bowel. Mod A   Bed Mobility  Supine <> sit: Dependent Mod A supine to sit   Max A sit to supine   Rolling side to side for ADL min A.       Functional Transfers STS: Dependent  Scooting to HOB: Dependent    Mod  sit to stand   Max A 1-2 steps towards the [facility].   Mod A   Functional Mobility Unable to step Mod A side step to the [facility]  Mod A during ADLs   Balance Sitting: fair/fair minus sitting balance   Sitting EOB x4-5 min    Standing: poor      Activity Tolerance Poor  4L O2 throughout session Poor +   Fatigue and SOB however O2 saturation 85-88% during exertion.   standing ron x3-4 min with fair minus balance during self care tasks   B UE  R UE: proximally: 1/5  Distally: 2-/5     L UE: proximally: 2-/5  Distally: 3-/5 Limited use of UE's for functional activity. Declined UE exercise while seated on the EOB.   B UE: 3+/5       Comments:  Pt returned to bed at end of the session. Education/treatment:  ADL retraining with facilitation of movement to increase self care skills. Therapeutic activity to address balance, strength, and endurance for increased independence with ADL and transfers. Pt education of transfer safety and energy conservation. · Pt has made  progress towards set goals.    · Continue with current plan of care        Treatment Charges: Mins Units    ADL/Home Mgt 36156 10 1    Thera Activities 33306 17 1    Ther Ex 39835      Manual Therapy 22091      Neuro Re-ed 75248      Orthotic manage/training  47030      Non Billable Time      Total Timed Treatment 27 [address] [facility]/L 19569

## 2020-04-11 NOTE — PROGRESS NOTES
[facility] Progress Note    Admitting Date and Time: 3/30/2020 11:48 AM  Admit Dx: Acute respiratory failure with hypoxia (HCC) [J96.01]  Acute respiratory failure with hypoxia (HCC) [J96.01]    Subjective:  Patient is being followed for Acute respiratory failure with hypoxia ([address] [J96.01]  Acute respiratory failure with hypoxia ([address] [J96.01]   Pt feels weak, dry mouth. Resting in bed comfortably. No distress. Very weak voice. 4 L nasal cannula at 93% oxygen, continuous pulse ox. Per RN: Poor oral intake. ROS: denies fever, chills, cp, sob, n/v, HA unless stated above.  clotrimazole  10 mg Oral 5x Daily    docusate sodium  100 mg Oral Daily    enoxaparin  40 mg Subcutaneous Daily    aspirin  81 mg Oral QAM    clopidogrel  75 mg Oral QAM    levothyroxine  50 mcg Oral QAM AC    metFORMIN  1,000 mg Oral BID WC    atorvastatin  40 mg Oral Daily    Vitamin D  1,000 Units Oral QAM    lisinopril  10 mg Oral QAM    sodium chloride flush  10 mL Intravenous 2 times per day     sodium chloride flush, 10 mL, PRN  acetaminophen, 650 mg, Q6H PRN    Or  acetaminophen, 650 mg, Q6H PRN  polyethylene glycol, 17 g, Daily PRN  promethazine, 12.5 mg, Q6H PRN    Or  ondansetron, 4 mg, Q6H PRN         Objective:    BP (!) 140/80   Pulse 75   Temp 96.8 °F (36 °C) (Axillary)   Resp 18   Ht 5' 10\" (1.778 m)   Wt 166 lb 1.6 oz (75.3 kg)   SpO2 93%   BMI 23.83 kg/m²     General Appearance: alert and oriented to person, place and time and in no acute distress. Skin: warm and dry  Head: normocephalic and atraumatic. Very dry mucous membranes.   No noted thrush  Eyes: pupils equal, round, and reactive to light, extraocular eye movements intact, conjunctivae normal  Neck: neck supple and non tender without mass   Pulmonary/Chest: clear to auscultation bilaterally- no wheezes, rales or rhonchi, normal air movement, no respiratory distress  Cardiovascular: normal rate, normal S1 and S2 and no carotid bruits  Abdomen: soft, non-tender, non-distended, normal bowel sounds, no masses or organomegaly  - gentile cath  Extremities: no cyanosis, no clubbing and no edema  Neurologic: no cranial nerve deficit and speech normal  H/L right forearm        Recent Labs     04/09/20  0910 04/10/20  0030 04/11/20  0350    143 150*   K 3.9 4.2 3.8    109* 114*   CO2 22 21* 20*   BUN 27* 31* 31*   CREATININE 0.9 1.0 0.9   GLUCOSE 142* 125* 127*   CALCIUM 8.9 8.6 9.3       Recent Labs     04/09/20  0910 04/10/20  0030 04/11/20  0350   WBC 9.9 9.3 13.0*   RBC 4.53 3.57* 4.66   HGB 13.5 10.7* 13.9   HCT 42.0 33.5* 42.9   MCV 92.7 93.8 92.1   MCH 29.8 30.0 29.8   MCHC 32.1 31.9* 32.4   RDW 14.6 14.6 14.7    296 390   MPV 9.7 10.4 10.2     COVID neg sent 3/30/20  COVID sent 4/7/20  resp array neg  procal 0.15  LA 2.0  BC neg        Radiology: CT chest 3/30/20  Impression       1. Scattered pulmonary consolidative opacities in the lungs, most   notably in the left lower lobe, likely infectious in etiology. 2. Areas of calcification in the subpleural regions of the left lower   lobe consolidation is likely due to superimposed chronic scarring. 3. 1.5 cm nodular density in the left upper lobe, which may be   infectious. Neoplastic etiology cannot be excluded. Follow-up CT of   the chest recommended in 3 months. 4. Moderate to large hiatal hernia. Assessment:    Active Problems:    Acute respiratory failure with hypoxia ([address]    COVID-19 virus infection  Resolved Problems:    * No resolved hospital problems. *      Plan:  1. Pneumonia -etiology possible viral, rule out COVID 19 VS CAP, left lower lung pneumonia ( more likley )  -Completed Plaquenil 10 days, completed 5 days ceftriaxone.  -4 days Levaquin- done  -ID input.  - COVID NEG sent 3/30/20  - completed ATBs    2. Acute on chronic hypoxic respiratory failure. --Per documentation wears 2 L nasal cannula at home.  -Patient was placed on 4

## 2020-04-12 NOTE — PROGRESS NOTES
Oral Daily Filomena Dexter MD   100 mg at 04/12/20 0838    enoxaparin (LOVENOX) injection 40 mg  40 mg Subcutaneous Daily Filomena Dexter MD   40 mg at 04/12/20 0641    aspirin chewable tablet 81 mg  81 mg Oral QAM Bo Kettle, APN   81 mg at 04/12/20 0838    clopidogrel (PLAVIX) tablet 75 mg  75 mg Oral QA King Bellatle, APN   75 mg at 04/12/20 8368    levothyroxine (SYNTHROID) tablet 50 mcg  50 mcg Oral QAM AC Bo Kettle, APN   50 mcg at 04/12/20 0542    metFORMIN (GLUCOPHAGE) tablet 1,000 mg  1,000 mg Oral BID WC Bo Kettle, APN   1,000 mg at 04/12/20 7574    atorvastatin (LIPITOR) tablet 40 mg  40 mg Oral Daily Bo Kettle, APN   40 mg at 04/12/20 7473    vitamin D (CHOLECALCIFEROL) tablet 1,000 Units  1,000 Units Oral [facility] Bo Kettle, APN   1,000 Units at 04/12/20 7858    lisinopril (PRINIVIL;ZESTRIL) tablet 10 mg  10 mg Oral QAM Bo Kettle, APN   10 mg at 04/12/20 5374    sodium chloride flush 0.9 % injection 10 mL  10 mL Intravenous 2 times per day King Bell[place]le, APN   10 mL at 04/12/20 2588    sodium chloride flush 0.9 % injection 10 mL  10 mL Intravenous PRN Bo Kettle, APN   10 mL at 04/09/20 3911    acetaminophen (TYLENOL) tablet 650 mg  650 mg Oral Q6H PRN Bo Kettle, APN   650 mg at 04/02/20 0522    Or    acetaminophen (TYLENOL) suppository 650 mg  650 mg Rectal Q6H PRN Bo Kettle, APN        polyethylene glycol (GLYCOLAX) packet 17 g  17 g Oral Daily PRN Bo Kettle, APN        promethazine (PHENERGAN) tablet 12.5 mg  12.5 mg Oral Q6H PRN Bo Kettle, APN        Or    ondansetron (ZOFRAN) injection 4 mg  4 mg Intravenous Q6H PRN Bo Kettle, APN           CT Head WO Contrast   Final Result   Encephalomalacia on the left likely related to 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 Atrophy and probable chronic microvascular ischemic disease. Paranasal sinus disease.       CT Chest WO Contrast   Final Result

## 2020-04-12 NOTE — PROCEDURES
PRESSURE INJECTABLE MIDLINE   Catheter insertion date 4/12/2020     Product Number:  [id number]   Lot No: [id number] EXP. DATE: 12/31/20   Gauge: 4.5 FR   Lumen: SINGLE   RIGHT BRACHIAL    Vein Diameter : 0.43 CM   Upper arm circumference 10 cm above the AC: 28 CM   Catheter Length : 15 CM   Internal Length: 14 CM   Exposed Catheter Length: 1 CM   Ultrasound Used:YES   Flushed with 20 ml Normal Saline.   Brisk blood return, flushes easily  : MARIAN JEFFRIES RN [facility]

## 2020-04-12 NOTE — PROGRESS NOTES
Notified Dr. Pito Hood re: pt's [address] redraw--   Ref. Range 4/12/2020 00:15   Sodium Latest Ref Range: 132 - 146 mmol/L 149 (H)   Potassium Latest Ref Range: 3.5 - 5.0 mmol/L 3.7   Chloride Latest Ref Range: 98 - 107 mmol/L 118 (H)   CO2 Latest Ref Range: 22 - 29 mmol/L 20 (L)   BUN Latest Ref Range: 8 - 23 mg/dL 35 (H)   Creatinine Latest Ref Range: 0.7 - 1.2 mg/dL 0.9   Anion Gap Latest Ref Range: 7 - 16 mmol/L 11   GFR Non- Latest Ref Range: >=60 mL/min/1.73 >60   GFR African American Unknown >60   Glucose Latest Ref Range: 74 - 99 mg/dL 133 (H)   Calcium Latest Ref Range: 8.6 - 10.2 mg/dL 8.9       Per Dr. Pito Hood, draw CMP Q6H & check mag and phos daily. Will continue to monitor.

## 2020-04-13 NOTE — PROGRESS NOTES
Paged Dr. Sita Eller re: lab results below from 2045 this evening. No new orders received at this time. Per Dr. Sita Eller, page with 3160 lab results in AM.      Results for Tuan Haddad (MRN [id number]) as of 4/12/2020 23:11   Ref.  Range 4/12/2020 20:45   Sodium Latest Ref Range: 132 - 146 mmol/L 148 (H)   Potassium Latest Ref Range: 3.5 - 5.0 mmol/L 3.8   Chloride Latest Ref Range: 98 - 107 mmol/L 114 (H)   CO2 Latest Ref Range: 22 - 29 mmol/L 20 (L)   BUN Latest Ref Range: 8 - 23 mg/dL 31 (H)   Creatinine Latest Ref Range: 0.7 - 1.2 mg/dL 0.9   Anion Gap Latest Ref Range: 7 - 16 mmol/L 14   GFR Non- Latest Ref Range: >=60 mL/min/1.73 >60   GFR African American Unknown >60   Glucose Latest Ref Range: 74 - 99 mg/dL 194 (H)   Calcium Latest Ref Range: 8.6 - 10.2 mg/dL 8.6

## 2020-04-13 NOTE — PROGRESS NOTES
[facility] Quality Flow/Interdisciplinary Rounds Progress Note        Quality Flow Rounds held on April 13, 2020    Disciplines Attending:  Bedside Nurse, ,  and Nursing Unit Leadership    Xi Echevarria was admitted on 3/30/2020 11:48 AM    Anticipated Discharge Date:  Expected Discharge Date: 04/03/20    Disposition:    Michael Score:  Michael Scale Score: 15    Readmission Risk              Risk of Unplanned Readmission:        19           Discussed patient goal for the day, patient clinical progression, and barriers to discharge. The following Goal(s) of the Day/Commitment(s) have been identified:  wean oxygen, monitor labs.       Adron Seen  April 13, 2020

## 2020-04-13 NOTE — PROGRESS NOTES
compensatory strategies for safe swallow with good outcome. Questions answered. Will continue SP intervention as per previously established POC.     [address] 4710  4/13/2020       CPT code(s) 97595  dysphagia tx  Total minutes :  15 minutes

## 2020-04-14 NOTE — PROGRESS NOTES
solids and liquids, no straws        Oral- adequate labial seal and A-P transfer, mod oral residue      Pharyngeal- overt signs or symptoms of aspiration with HTL and trial of thin liquids. Education- Pt educated on results and POC. Pt trained on compensatory strategies for safe swallow with good outcome. Questions answered. Will continue SP intervention as per previously established POC. Carmenza JOHNSON CCC/SLP [id number]  Speech-Language Pathologist      4/14/2020       CPT code(s) 01256  dysphagia tx  Total minutes :  30 minutes

## 2020-04-14 NOTE — PROGRESS NOTES
04/12/20  1430 04/13/20  0245 04/14/20  0420   WBC 10.1 13.1* 9.4   RBC 3.76* 4.07 3.80   HGB 11.2* 12.3* 11.4*   HCT 35.5* 38.3 35.8*   MCV 94.4 94.1 94.2   MCH 29.8 30.2 30.0   MCHC 31.5* 32.1 31.8*   RDW 14.8 14.8 14.6    349 249   MPV 10.2 9.8 10.2       CBC with Differential:    Lab Results   Component Value Date    WBC 9.4 04/14/2020    RBC 3.80 04/14/2020    HGB 11.4 04/14/2020    HCT 35.8 04/14/2020     04/14/2020    MCV 94.2 04/14/2020    MCH 30.0 04/14/2020    MCHC 31.8 04/14/2020    RDW 14.6 04/14/2020    NRBC 0.0 04/06/2020    METASPCT 1.0 04/07/2020    LYMPHOPCT 17.5 04/14/2020    MONOPCT 7.4 04/14/2020    MYELOPCT 0.9 04/06/2020    BASOPCT 0.4 04/14/2020    MONOSABS 0.70 04/14/2020    LYMPHSABS 1.65 04/14/2020    EOSABS 0.07 04/14/2020    BASOSABS 0.04 04/14/2020     CMP:    Lab Results   Component Value Date     04/14/2020    K 3.5 04/14/2020     04/14/2020    CO2 23 04/14/2020    BUN 22 04/14/2020    CREATININE 1.0 04/14/2020    GFRAA >60 04/14/2020    LABGLOM >60 04/14/2020    GLUCOSE 158 04/14/2020    PROT 5.5 04/14/2020    LABALBU 2.8 04/14/2020    CALCIUM 8.6 04/14/2020    BILITOT 0.5 04/14/2020    ALKPHOS 106 04/14/2020    AST 18 04/14/2020    ALT 9 04/14/2020     Magnesium:    Lab Results   Component Value Date    MG 2.0 04/14/2020     Phosphorus:    Lab Results   Component Value Date    PHOS 2.0 04/14/2020        Radiology:   CT Head WO Contrast   Final Result   Encephalomalacia on the left likely related to 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 Atrophy and probable chronic microvascular ischemic disease. Paranasal sinus disease. CT Chest WO Contrast   Final Result      1. Scattered pulmonary consolidative opacities in the lungs, most   notably in the left lower lobe, likely infectious in etiology. 2. Areas of calcification in the subpleural regions of the left lower   lobe consolidation is likely due to superimposed chronic scarring.    3. 1.5 cm nodular density in the left

## 2020-04-14 NOTE — PROGRESS NOTES
Occupational Therapy  OT BEDSIDE TREATMENT NOTE      Date:2020  Patient Name: Yao Cleaning  MRN: [id number]  : 1932  Room: [address] [place] [address]     Referring Provider: Mortimer Rosette, MD     Evaluating OT: Isabella Lopez OTR/L [id number]     Pt okay for therapy per nursing 20     AM-PAC Daily Activity Raw Score: 14     Recommended Adaptive Equipment: continue to assess      Diagnosis: acute respiratory failure with hypoxia. Pertinent Medical History: CVA, HTN, DM, arthritis   Precautions:  Falls, O2, droplet plus isolation COVID-19 rule out     Home Living: Pt is a poor historian, unable to provide PLOF. Per medical chart pt lives with wife who is also currently hospitalized. Per son pt has been using [employer] and cane with recent falls at home. Unknown PLOF in ADL/IADLs.    Pain Level:  Pt did not complain of pain      Cognition: Alert and grossly oriented. Slow to respond to directions. Encouragement to increase active participation in activity. Functional Assessment:    Initial Eval Status  Date: 3/31/20 Treatment session:  Short Term Goals  Treatment frequency: 2-5x/wk PRN x1-3 wks      Feeding Dependent  Holding cup to drink for sip of water  SBA   Grooming Dependent Mod A  wash face while seated on the EOB. Min A   UB Dressing Dependent  Management of hospital gown   Min A   LB Dressing Dependent  Donning B socks Max A  don slipper socks Mod A    Bathing Dependent   Mod A   Toileting Dependent  Mod A   Bed Mobility  Supine <> sit: Dependent Mod supine to sit with poor initiation of participation       Functional Transfers STS: Dependent  Scooting to HOB: Dependent    Mod  sit to stand  Mod A   Functional Mobility Unable to step   Mod A during ADLs   Balance Sitting: fair/fair minus sitting balance   Sitting EOB x4-5 min      Standing: poor Stand balance with min-mod A while receiving assistance with ADL.       Activity Tolerance Poor  4L O2 throughout session Poor +   Fatigue and

## 2020-04-14 NOTE — PROGRESS NOTES
Nephrology Note    4/14/20: Patient seen  Chart reviewed. Resting quietly in bed  Apppetite is  poor and he is taking minimal  thickened liquids.     Vital SignsBP 112/80   Pulse 74   Temp 95.6 °F (35.3 °C) (Axillary)   Resp 24   Ht 5' 10\" (1.778 m)   Wt 166 lb 1.6 oz (75.3 kg)   SpO2 92%   BMI 23.83 kg/m²   24HR INTAKE/OUTPUT:      Intake/Output Summary (Last 24 hours) at 4/14/2020 1404  Last data filed at 4/14/2020 1001  Gross per 24 hour   Intake 888 ml   Output 475 ml   Net 413 ml         Physical Exam  Pt in Isolation for COVID-19 not examined    ROS:  RESPIRATORY: Positive for cough and shortness of breath  CARDIOVASCULAR:  negative  GASTROINTESTINAL: Positive for poor oral intake  GENITOURINARY:  negative    Current Facility-Administered Medications   Medication Dose Route Frequency Provider Last Rate Last Dose    potassium phosphate 10 mmol in dextrose 5 % 250 mL IVPB  10 mmol Intravenous Once Silvestre Hric, DO        lidocaine 1 % injection 5 mL  5 mL Intradermal Once PIERRE Gorman - CNS        sodium chloride flush 0.9 % injection 10 mL  10 mL Intravenous PRN Finn Hinds APRN - CNS        heparin flush 100 UNIT/ML injection 100 Units  1 mL Intravenous 2 times per day PIERRE Gorman - CNS   100 Units at 04/14/20 1001    heparin flush 100 UNIT/ML injection 100 Units  1 mL Intracatheter PRN PIERRE Gorman - CNS        dextrose 5 % solution   Intravenous Continuous Miguel Tatyana Whyte MD 80 mL/hr at 04/13/20 2004      clotrimazole [facility]) savanah 10 mg  10 mg Oral 5x Daily Precious Álvarez MD   10 mg at 04/14/20 1000    docusate sodium (COLACE) capsule 100 mg  100 mg Oral Daily Precious Álvarez MD   100 mg at 04/14/20 1000    enoxaparin (LOVENOX) injection 40 mg  40 mg Subcutaneous Daily Precious Álvarez MD   40 mg at 04/14/20 1000    aspirin chewable tablet 81 mg  81 mg Oral QAMARCO Pak   81 mg at 04/14/20 1001    clopidogrel (PLAVIX) tablet 75 mg  75 mg Oral QAM Lacy Mu, APN   75 mg at 04/14/20 1000    levothyroxine (SYNTHROID) tablet 50 mcg  50 mcg Oral QAM AC Lacy Mu, APN   50 mcg at 04/14/20 0502    metFORMIN (GLUCOPHAGE) tablet 1,000 mg  1,000 mg Oral BID WC Lacy Mu, APN   1,000 mg at 04/14/20 1000    atorvastatin (LIPITOR) tablet 40 mg  40 mg Oral Daily Lacy Mu, APN   40 mg at 04/14/20 1000    vitamin D (CHOLECALCIFEROL) tablet 1,000 Units  1,000 Units Oral QAM Lacy Mu, APN   1,000 Units at 04/14/20 1000    sodium chloride flush 0.9 % injection 10 mL  10 mL Intravenous 2 times per day Lacy Mu, APN   10 mL at 04/14/20 1001    sodium chloride flush 0.9 % injection 10 mL  10 mL Intravenous PRN Lacy Mu, APN   10 mL at 04/09/20 4217    acetaminophen (TYLENOL) tablet 650 mg  650 mg Oral Q6H PRN Lacy Mu, APN   650 mg at 04/02/20 0522    Or    acetaminophen (TYLENOL) suppository 650 mg  650 mg Rectal Q6H PRN Lacy Mu, APN        polyethylene glycol (GLYCOLAX) packet 17 g  17 g Oral Daily PRN Lacy Mu, APN        promethazine (PHENERGAN) tablet 12.5 mg  12.5 mg Oral Q6H PRN Lacy Mu, APN        Or    ondansetron (ZOFRAN) injection 4 mg  4 mg Intravenous Q6H PRN Lacy Mu, APN           CT Head WO Contrast   Final Result   Encephalomalacia on the left likely related to 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 Atrophy and probable chronic microvascular ischemic disease. Paranasal sinus disease. CT Chest WO Contrast   Final Result      1. Scattered pulmonary consolidative opacities in the lungs, most   notably in the left lower lobe, likely infectious in etiology. 2. Areas of calcification in the subpleural regions of the left lower   lobe consolidation is likely due to superimposed chronic scarring. 3. 1.5 cm nodular density in the left upper lobe, which may be   infectious. Neoplastic etiology cannot be excluded.  Follow-up CT of   the chest recommended

## 2020-04-15 NOTE — PROGRESS NOTES
229* 158* 127*   CALCIUM 8.5* 8.6 8.4*       Recent Labs     04/13/20  0245 04/14/20  0420 04/15/20  0240   WBC 13.1* 9.4 9.5   RBC 4.07 3.80 3.77*   HGB 12.3* 11.4* 11.2*   HCT 38.3 35.8* 34.7*   MCV 94.1 94.2 92.0   MCH 30.2 30.0 29.7   MCHC 32.1 31.8* 32.3   RDW 14.8 14.6 14.6    249 240   MPV 9.8 10.2 10.4       CBC with Differential:    Lab Results   Component Value Date    WBC 9.5 04/15/2020    RBC 3.77 04/15/2020    HGB 11.2 04/15/2020    HCT 34.7 04/15/2020     04/15/2020    MCV 92.0 04/15/2020    MCH 29.7 04/15/2020    MCHC 32.3 04/15/2020    RDW 14.6 04/15/2020    NRBC 0.0 04/06/2020    METASPCT 1.0 04/07/2020    LYMPHOPCT 21.7 04/15/2020    MONOPCT 7.0 04/15/2020    MYELOPCT 0.9 04/06/2020    BASOPCT 0.4 04/15/2020    MONOSABS 0.66 04/15/2020    LYMPHSABS 2.05 04/15/2020    EOSABS 0.11 04/15/2020    BASOSABS 0.04 04/15/2020     CMP:    Lab Results   Component Value Date     04/15/2020    K 3.2 04/15/2020     04/15/2020    CO2 21 04/15/2020    BUN 18 04/15/2020    CREATININE 0.9 04/15/2020    GFRAA >60 04/15/2020    LABGLOM >60 04/15/2020    GLUCOSE 127 04/15/2020    PROT 5.4 04/15/2020    LABALBU 2.6 04/15/2020    CALCIUM 8.4 04/15/2020    BILITOT 0.5 04/15/2020    ALKPHOS 99 04/15/2020    AST 18 04/15/2020    ALT 8 04/15/2020     Magnesium:    Lab Results   Component Value Date    MG 1.8 04/15/2020     Phosphorus:    Lab Results   Component Value Date    PHOS 2.3 04/15/2020        Radiology:   CT Head WO Contrast   Final Result   Encephalomalacia on the left likely related to 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 Atrophy and probable chronic microvascular ischemic disease. Paranasal sinus disease. CT Chest WO Contrast   Final Result      1. Scattered pulmonary consolidative opacities in the lungs, most   notably in the left lower lobe, likely infectious in etiology.    2. Areas of calcification in the subpleural regions of the left lower   lobe consolidation is likely due to superimposed chronic scarring. 3. 1.5 cm nodular density in the left upper lobe, which may be   infectious. Neoplastic etiology cannot be excluded. Follow-up CT of   the chest recommended in 3 months. 4. Moderate to large hiatal hernia. Assessment:    Active Problems:    Acute respiratory failure with hypoxia (HCC)    COVID-19 virus infection    Hypernatremia    Dehydration    Hypophosphatemia    Dysphagia  Resolved Problems:    * No resolved hospital problems. *      Plan:  1. Acute respiratory failure with hypoxia wean oxygen as able  2. Hypokalemia monitor and replace prn  3. Pneumonia likely bacterial abx finished  4. Hypernatremia fluids per renal  5. Dehydration fluids per renal   Encourage po intake  6. htn monitor and tx.  7. Thrush continue mycelex  8. Hypophosphatemia monitor and replace prn  9. Acidosis monitor  10. Dysphagia continue modified diet  11. Dm type 2 monitor bs and tx  12.  Hyperlipidemia continue med    Will consult palliatrve medicine        Electronically signed by Eduar Moore DO on 4/15/2020 at 5:55 PM

## 2020-04-15 NOTE — PROGRESS NOTES
Nephrology Note    4/15/20: Patient seen  Chart reviewed. Resting quietly in bed  Apppetite is  poor and he is taking minimal  thickened liquids.     Vital SignsBP (!) 142/72   Pulse 66   Temp 97.3 °F (36.3 °C) (Axillary)   Resp 24   Ht 5' 10\" (1.778 m)   Wt 166 lb 1.6 oz (75.3 kg)   SpO2 96%   BMI 23.83 kg/m²   24HR INTAKE/OUTPUT:      Intake/Output Summary (Last 24 hours) at 4/15/2020 1609  Last data filed at 4/15/2020 1059  Gross per 24 hour   Intake 2880 ml   Output 1000 ml   Net 1880 ml         Physical Exam  Pt in Isolation for COVID-19 not examined    ROS:  Not performed    Current Facility-Administered Medications   Medication Dose Route Frequency Provider Last Rate Last Dose    potassium phosphate 20 mmol in dextrose 5 % 250 mL IVPB  20 mmol Intravenous Once Natty Dawkins MD        lidocaine 1 % injection 5 mL  5 mL Intradermal Once PIERRE Baker        sodium chloride flush 0.9 % injection 10 mL  10 mL Intravenous PRN PIERRE Baker        heparin flush 100 UNIT/ML injection 100 Units  1 mL Intravenous 2 times per day PIERRE Baker   100 Units at 04/15/20 1031    heparin flush 100 UNIT/ML injection 100 Units  1 mL Intracatheter PRN PIERRE Baker        clotrimazole [facility]) savanah 10 mg  10 mg Oral 5x Daily Siri Baptiste MD   10 mg at 04/15/20 1033    docusate sodium (COLACE) capsule 100 mg  100 mg Oral Daily Siri Baptiste MD   100 mg at 04/15/20 1032    enoxaparin (LOVENOX) injection 40 mg  40 mg Subcutaneous Daily Siri Baptiste MD   40 mg at 04/15/20 1032    aspirin chewable tablet 81 mg  81 mg Oral MARCO Alvarez   81 mg at 04/15/20 1031    clopidogrel (PLAVIX) tablet 75 mg  75 mg Oral QAM ALDO KeitaN   75 mg at 04/15/20 1032    levothyroxine (SYNTHROID) tablet 50 mcg  50 mcg Oral QAM AC ALDO KeitaN   50 mcg at 04/15/20 8858    metFORMIN (GLUCOPHAGE) tablet 1,000 mg  1,000 mg Oral BID ED WC Jose Pointer, APN   1,000 mg at 04/15/20 1031    atorvastatin (LIPITOR) tablet 40 mg  40 mg Oral Daily Jose Pointer, APN   40 mg at 04/15/20 1032    vitamin D (CHOLECALCIFEROL) tablet 1,000 Units  1,000 Units Oral QAM Jose Pointer, APN   1,000 Units at 04/15/20 1031    sodium chloride flush 0.9 % injection 10 mL  10 mL Intravenous 2 times per day Jose Pointer, APN   10 mL at 04/15/20 1031    sodium chloride flush 0.9 % injection 10 mL  10 mL Intravenous PRN Jose Pointer, APN   10 mL at 04/09/20 1917    acetaminophen (TYLENOL) tablet 650 mg  650 mg Oral Q6H PRN Jose Pointer, APN   650 mg at 04/02/20 0522    Or    acetaminophen (TYLENOL) suppository 650 mg  650 mg Rectal Q6H PRN Jose Pointer, APN        polyethylene glycol (GLYCOLAX) packet 17 g  17 g Oral Daily PRN Jose Pointer, APN        promethazine (PHENERGAN) tablet 12.5 mg  12.5 mg Oral Q6H PRN Jose Pointer, APN        Or    ondansetron (ZOFRAN) injection 4 mg  4 mg Intravenous Q6H PRN Jose Pointer, APN           CT Head WO Contrast   Final Result   Encephalomalacia on the left likely related to 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 Atrophy and probable chronic microvascular ischemic disease. Paranasal sinus disease. CT Chest WO Contrast   Final Result      1. Scattered pulmonary consolidative opacities in the lungs, most   notably in the left lower lobe, likely infectious in etiology. 2. Areas of calcification in the subpleural regions of the left lower   lobe consolidation is likely due to superimposed chronic scarring. 3. 1.5 cm nodular density in the left upper lobe, which may be   infectious. Neoplastic etiology cannot be excluded. Follow-up CT of   the chest recommended in 3 months. 4. Moderate to large hiatal hernia.             Labs:    CBC:   Recent Labs     04/13/20  0245 04/14/20  0420 04/15/20  0240   WBC 13.1* 9.4 9.5   HGB 12.3* 11.4* 11.2*    249 240        No results found for: IRON, TIBC, FERRITIN    Lab Results   Component Value Date    CALCIUM 8.4 (L) 04/15/2020    CALCIUM 8.6 04/14/2020    CALCIUM 8.5 (L) 04/13/2020    PHOS 2.3 (L) 04/15/2020    PHOS 2.0 (L) 04/14/2020    PHOS 2.3 (L) 04/13/2020    MG 1.8 04/15/2020    MG 2.0 04/14/2020    MG 2.1 04/13/2020       BMP:   Recent Labs     04/13/20  0915 04/14/20  0420 04/15/20  0240   * 151* 139   K 3.7 3.5 3.2*   * 118* 106   CO2 23 23 21*   BUN 28* 22 18   CREATININE 1.0 1.0 0.9   GLUCOSE 229* 158* 127*             Assessment: / Plan:    1. Hypernatremia. Hypernatremia secondary volume depletion with markedly poor oral intake. Urine studies also reflective of that. Patient Na+ minimal change-Na+ 139  PLAN:1. Stopped IVF    2. Benign essential hypertension. Blood pressures are at/near goal <130/80   PLAN:1. Continue to monitor off ARB    3. Metabolic acidosis. Stable. Anion gap normal.   PLAN:1. follow. 4.  Dehydration. Resolved    5. Hypokalemia-with total body depletion due to poor po intake  PLAN:1.IV supplementing with 20meq  2. Recheck this PM    6. Hypophosphatemia-sec to poor po intake  PLAN:1. 20mMol KPO4 IV ordered  2.  Recheck this PM    E lectronically signed by Nadege Joiner MD

## 2020-04-15 NOTE — PROGRESS NOTES
Physical Therapy    PT treatment attempted. Unable to see due to medication administration. Will re-attempt at later date.

## 2020-04-15 NOTE — PROGRESS NOTES
[facility] [place] Quality Flow/Interdisciplinary Rounds Progress Note        Quality Flow Rounds held on April 15, 2020    Disciplines Attending:  Bedside Nurse, ,  and Nursing Unit Leadership    Xi Echevarria was admitted on 3/30/2020 11:48 AM    Anticipated Discharge Date:  Expected Discharge Date: 04/03/20    Disposition:    Michael Score:  Michael Scale Score: 14    Readmission Risk              Risk of Unplanned Readmission:        20           Discussed patient goal for the day, patient clinical progression, and barriers to discharge. The following Goal(s) of the Day/Commitment(s) have been identified:  replace electrolytes, discharge planning.        Adron Seen  April 15, 2020

## 2020-04-16 NOTE — PROGRESS NOTES
sitting and standing this date. Pt unable to self correct.         Patient education  Pt was educated on exercise, transfers     Patient response to education:   Pt verbalized understanding Pt demonstrated skill Pt requires further education in this area   yes With instruction yes      Treatment :   Upon arrival, pt did not have O2 donned. Pulse ox 91%. Re-applied pulse ox . Mobility as above   Pt sat at EOB at 10 minutes for trunk strengthening and postural exercises, increased difficulty with this today.  Slow response to questioning and commands throughout session   RTB after mobility      Pt was left in bed with call light in reach.     Chair/bed alarm: bed alarm active     Time VQ 4040   Time out 1309   Total Treatment Time 23 minutes         Therapeutic activities 99923 23 minutes            Pt is making fair progress toward established Physical Therapy goals    Continue with physical therapy current plan of care      Cesilia Billings   PT [id number]

## 2020-04-16 NOTE — TELEPHONE ENCOUNTER
Pt's son Connie Shetty calling to find out what he needs to do to gain power of  for both his mother and father who are hospitalized and unable to make their own decisions.  Please advise

## 2020-04-16 NOTE — TELEPHONE ENCOUNTER
DURABLE POWER OF  he is a legal designation. He will have to contact an  who will then request a medical opinion to their mental capability. This is not a quick process. Unless he can get his parents to sign legal documents for the .

## 2020-04-16 NOTE — PROGRESS NOTES
Occupational Therapy  OT BEDSIDE TREATMENT NOTE      Date:2020  Patient Name: Martin Muñoz  MRN: [id number]  : 1932  Room: [address] [place] [address]     Referring Provider: Tanvi Blue MD     Evaluating OT: Erik Villegas OTR/L [id number]     Pt okay for therapy per nursing 20     AM-PAC Daily Activity Raw Score: 14     Recommended Adaptive Equipment: continue to assess      Diagnosis: acute respiratory failure with hypoxia. Pertinent Medical History: CVA, HTN, DM, arthritis   Precautions:  Falls, O2, droplet plus isolation      Home Living: Pt is a poor historian, unable to provide PLOF. Per medical chart pt lives with wife who is also currently hospitalized. Per son pt has been using [employer] and cane with recent falls at home. Unknown PLOF in ADL/IADLs.    Pain Level:  Pt did not complain of pain      Cognition: Alert and grossly oriented. Slow to respond to directions. Encouragement to increase active participation in activity.                   Functional Assessment:    Initial Eval Status  Date: 3/31/20 Treatment session:  Short Term Goals  Treatment frequency: 2-5x/wk PRN x1-3 wks      Feeding Dependent  Holding cup to drink for sip of water Declined to eat any lunch  SBA   Grooming Dependent   Min A   UB Dressing Dependent  Management of hospital gown   Min A   LB Dressing Dependent  Donning B socks Max A  don slipper socks Mod A    Bathing Dependent   Mod A   Toileting Dependent  Mod A   Bed Mobility  Supine <> sit: Dependent Max A  supine <> sit      Functional Transfers STS: Dependent  Scooting to HOB: Dependent    Mod sit to stand  Mod A   Functional Mobility Unable to step Side step towards the HOB mod A   Mod A during ADLs   Balance Sitting: fair/fair minus sitting balance   Sitting EOB x4-5 min      Standing: poor Stand balance with min-mod inconsistent.       Activity Tolerance Poor  4L O2 throughout session Poor +   standing ron x3-4 min with fair minus balance during self care tasks   B

## 2020-04-16 NOTE — PROGRESS NOTES
scarring. 3. 1.5 cm nodular density in the left upper lobe, which may be   infectious. Neoplastic etiology cannot be excluded. Follow-up CT of   the chest recommended in 3 months. 4. Moderate to large hiatal hernia. Assessment:    Active Problems:    Acute respiratory failure with hypoxia (HCC)    COVID-19 virus infection    Hypernatremia    Dehydration    Hypophosphatemia    Dysphagia    Acidosis    Hypokalemia  Resolved Problems:    * No resolved hospital problems. *      Plan:  1. Acute respiratory failure with hypoxia wean oxygen as able  2. Hypokalemia monitor and replace prn  3. Pneumonia likely bacterial abx finished  4. Hypernatremia fluids per renal  5. Dehydration fluids per renal   Encourage po intake  6. htn monitor and tx.  7. Thrush continue mycelex  8. Hypophosphatemia monitor and replace prn  9. Acidosis monitor  10. Dysphagia d/w speech tx. Recommend npo  11. Dm type 2 controlled monitor bs and tx  12. Hyperlipidemia continue med     Overall, concerned for pt long term. Palliative consulted. Will need to see about pt's long term goals and treatment such as peg tube.        Electronically signed by Donna Ashley DO on 4/16/2020 at 3:45 PM

## 2020-04-16 NOTE — CONSULTS
required increasing assistance and encouragement for continued self-care from his mother. Mentally he does not feel his dementia was extremely advanced. Goals of care: improve or maintain function/quality of life, preserve independence/autonomy/control, support for family/caregiver, continue current management and to be determined    Code Status: full code    Advanced Directives: none     Surrogate/Legal NOK: Spouse  Contacts:  Seamus Adams [phone]  [id number] [phone]  Anand Hubbard [phone]  Cristina Zamora granddaughter [phone]    Spiritual assessment: No spiritual distress identified   Bereavement and grief: high-risk bereavement and current visitor restriction due to pandemic with potential to create bereavement issues    History obtain from EMR and family    Past Medical History:   Diagnosis Date    Arthritis     Diabetes mellitus ([facility] [address]     Hypertension     Unspecified cerebral artery occlusion with cerebral infarction        Past Surgical History:   Procedure Laterality Date    APPENDECTOMY      CHOLECYSTECTOMY      HC INSERT PICC CATH, 5/> YRS - MIDLINE  4/12/2020            Current Medications:  Inpatient medications reviewed: yes  Home Medications reviewed: yes    No Known Allergies    Family History   Problem Relation Age of Onset    Diabetes Mother     Diabetes Sister        Social history:  [place] status: unknown  Marital status:   Living status: with family:  spouse   Work history: unknown    Review of Systems:  Unable to obtain, patient in isolation COVID19    Objective:   According to institutional recommendations and guidelines, a face-to-face encounter was not performed due to the current efforts to prevent transmission of COVID-19 and the need to preserve PPE for other caregivers. Relevant records, nursing assessment, and diagnostic testing including laboratory results and imaging were reviewed.  Please reference any relevant documentation [E86.0]     Hypophosphatemia [E83.39]     Dysphagia [R13.10]     Acute respiratory failure with hypoxia (HCC) [J96.01] 03/30/2020    COVID-19 virus infection [U07.1]        Acute respiratory failure with hypoxia:  -  Treatment per primary service  -  Requiring 3L O2 via NC, baseline 2 L O2 continuous    Viral pneumonia, COVID19 positive:  -  ID following  -  Treatment:s/p levaquin and hydroxychloroquine (plaquenil)  -  Treatment for secondary bacterial infection    Dysphagia:  -  Speech pathology following  -  NPO unless assist with meals due to observed aspiration with all consistencies    Vascular dementia:  -  Uncertain if patient has a formal diagnosis but CT findings consistent with vascular dementia, patient not taking any medications currently. Anorexia:  -  Dietary consulted, recommendations made  -  Uncertain etiology, likely to decide PEG tube vs pleasure feeds/comfort    GI:  - last documented BM 4/7, would increase bowel regimen    Palliative Care Encounter/Recommendations:  1. Continue current treatment  2. Patient's son wishes to discuss feeding tube with his father, I think he is going to use it as encouragement for eating more    Family Meeting:  Discussion held over telephone due to current visitor restriction secondary to [address] pandemic    Participants:Child and DIL  Family meeting was held to discuss:diagnosis, prognosis, treatment options, goals of care, prior expressed wishes, discharge plan, communication/update needs and no family meeting held     Discharge planning: to be determined    Discussed patient and the plan of care with the other IDT members of Palliative Care, and with family. Referrals to: none today    Time/Communication  Total time to complete consult/progress note 60 minutes, including chart review, coordination of care, discussion with attending/bedside RN, conversation with family.   Total time communicating with family over the telephone secondary to current visitor restrictions due to COVID-19 pandemic 30 minutes regarding goals of care, symptom management, diagnosis and prognosis,  medical update and support due to visitor restriction and see above. Miranda Avery PA-C  Palliative Medicine    Thank you for allowing Palliative Medicine to participate in the care of [facility]. Note: This report was completed using computerIES voiced recognition software. Every effort has been made to ensure accuracy; however, inadvertent computerized transcription errors may be present.

## 2020-04-16 NOTE — PROGRESS NOTES
SPEECH LANGUAGE PATHOLOGY  DAILY PROGRESS NOTE        PATIENT NAME:  Caity Cedillo      :  1932          TODAY'S DATE:  2020 ROOM:  [address] [place] [address]      SWALLOWING    Pt in bed, placed upright. Alert and following commands well. Per discussion with patient's nurse, he continues to refuse most foods. Inconsistently accepts PTL. Per nursing, son requested staff to contact him during noon meal so he can encourage increase in oral intake. SLP assisted pt in calling son, and they spoke for approximately 10 minutes. Min change in oral intake noted following conversation. Noon meal present. SLP fed pt. Pt accepted less than 25% with max encouragement. Inconsistent wet cough and vocal quality noted during and after meal with all consistencies presented this date. Denasalized voicing noted this date with decreased intelligibility. DYSPHAGIA DIAGNOSIS:  Oropharyngeal dysphagia-clinical s/s of aspiration observed with all consistencies presented                            DIET RECOMMENDATIONS: NPO at this time due to overt s/s of aspiration observed with all consistencies     Recommend increase in oral care and stand by assist for all meals if oral diet contines. Staff to assist as needed and provide encouragement to increase oral intake due to reported poor intake.                  FEEDING RECOMMENDATIONS:  If oral diet continues:                             Assistance level:  Stand by assistance is needed during all oral intake, staff to feed as needed                          Compensatory strategies recommended: Small bites/sips and Alternate solids and liquids, no straws        Oral- adequate labial seal and delayed A-P transfer, mod oral residue      Pharyngeal- overt signs of aspiration with all consistencies        Education- Pt educated on results and POC. Pt trained on compensatory strategies for safe swallow with fair outcome. Questions answered. Discussed above with nursing and physician. Will continue SP intervention as per previously established POC. Petty JOHNSON CCC/SLP [id number]  Speech-Language Pathologist      4/16/2020       CPT code(s) 58418  dysphagia tx  Total minutes :  45 minutes

## 2020-04-17 NOTE — PLAN OF CARE
Problem: Falls - Risk of:  Goal: Will remain free from falls  Description: Will remain free from falls  4/17/2020 0033 by Myla Chávez RN  Outcome: Met This Shift  4/16/2020 1840 by Linda Pollock RN  Outcome: Met This Shift  Goal: Absence of physical injury  Description: Absence of physical injury  4/17/2020 0033 by Myla Chávez RN  Outcome: Met This Shift  4/16/2020 1840 by Linda Pollock RN  Outcome: Met This Shift     Problem: Pain:  Goal: Pain level will decrease  Description: Pain level will decrease  4/17/2020 0033 by Myla Chávez RN  Outcome: Met This Shift  4/16/2020 1840 by Linda Pollock RN  Outcome: Met This Shift  Goal: Control of acute pain  Description: Control of acute pain  4/17/2020 0033 by Myla Chávez RN  Outcome: Met This Shift  4/16/2020 1840 by Linda Pollock RN  Outcome: Met This Shift  Goal: Control of chronic pain  Description: Control of chronic pain  4/17/2020 0033 by Myla Chávez RN  Outcome: Met This Shift  4/16/2020 1840 by Linda Pollock RN  Outcome: Met This Shift

## 2020-04-17 NOTE — PROGRESS NOTES
Nephrology Note    4/17/20: Patient seen  Chart reviewed.   Resting quietly in bed  Apppetite remains poor     Vital SignsBP 110/78   Pulse 67   Temp 98.1 °F (36.7 °C) (Temporal)   Resp 20   Ht 5' 10\" (1.778 m)   Wt 166 lb 1.6 oz (75.3 kg)   SpO2 96%   BMI 23.83 kg/m²   24HR INTAKE/OUTPUT:      Intake/Output Summary (Last 24 hours) at 4/17/2020 1618  Last data filed at 4/17/2020 1545  Gross per 24 hour   Intake 508 ml   Output 600 ml   Net -92 ml         Physical Exam  Pt in Isolation for COVID-19 not examined    ROS:  Not performed    Current Facility-Administered Medications   Medication Dose Route Frequency Provider Last Rate Last Dose    lidocaine 1 % injection 5 mL  5 mL Intradermal Once Chavez , APRN - CNS        sodium chloride flush 0.9 % injection 10 mL  10 mL Intravenous PRN Chavez , APRN - CNS        heparin flush 100 UNIT/ML injection 100 Units  1 mL Intravenous 2 times per day Chavez , APRN - CNS   100 Units at 04/16/20 2148    heparin flush 100 UNIT/ML injection 100 Units  1 mL Intracatheter PRN Chavez , APRN - CNS        clotrimazole [facility]) savanah 10 mg  10 mg Oral 5x Daily Solomon Condon MD   10 mg at 04/16/20 2148    docusate sodium (COLACE) capsule 100 mg  100 mg Oral Daily Solomon Condon MD   100 mg at 04/16/20 0915    enoxaparin (LOVENOX) injection 40 mg  40 mg Subcutaneous Daily Solomon Condon MD   40 mg at 04/17/20 1342    aspirin chewable tablet 81 mg  81 mg Oral QAM ALDO RitchieN   81 mg at 04/16/20 0916    clopidogrel (PLAVIX) tablet 75 mg  75 mg Oral QAM ALDO RitchieN   75 mg at 04/16/20 0916    levothyroxine (SYNTHROID) tablet 50 mcg  50 mcg Oral QAM AC ADLO RitchieN   50 mcg at 04/16/20 0521    atorvastatin (LIPITOR) tablet 40 mg  40 mg Oral Daily ALDO RitchieN   40 mg at 04/16/20 0915    vitamin D (CHOLECALCIFEROL) tablet 1,000 Units  1,000 Units Oral MARCO Brooke   1,000 Units at

## 2020-04-17 NOTE — PROGRESS NOTES
Sent a message to Humaira Isabel NP regarding need for clarification on dietary recommendations. Awaiting response.

## 2020-04-17 NOTE — PROGRESS NOTES
Needs:  · Estimated Daily Total Kcal: 6214-3464(MSJ x 1.2 SF per CBW)  · Estimated Daily Protein (g): (1.2-1.4 gm/kg per CBW)  · Estimated Daily Total Fluid (ml/day): 3355-6063(1 ml/kcal)    Nutrition Diagnosis:   · Problem: Inadequate oral intake  · Etiology: related to Difficulty swallowing(2/2 CVA hx w/ COVID19+)     Signs and symptoms:  as evidenced by NPO status due to medical condition, Intake 0-25%, Diet history of poor intake, Swallow study results    Objective Information:  · Nutrition-Focused Physical Findings: AMSx3, poor dentition, Abd/BS WDL, Trace BUE/BLE edema, +I/O's  · Wound Type: None  · Current Nutrition Therapies:  · Oral Diet Orders: NPO   · Oral Diet intake: NPO(~0-25% of most meals since adm as well as refusing meals at times per flowsheets/RN notes prior to NPO)  · Oral Nutrition Supplement (ONS) Orders: None  · ONS intake: NPO  · Anthropometric Measures:  · Ht: 5' 10\" (177.8 cm)   · Current Body Wt: 166 lb (75.3 kg)(bed 4/10; UTO CBW d/t COVID19 Iso)  · Admission Body Wt: 188 lb (85.3 kg)(3/30 actual)  · Usual Body Wt: (No recent EMR to confirm and pt poor historian)  · % Weight Change:  GAGAN properly at this time d/t no updated actual wt's since 4/10  · Ideal Body Wt: 166 lb (75.3 kg), % Ideal Body 104%  · BMI Classification: BMI 18.5 - 24.9 Normal Weight    Nutrition Interventions:   Continue NPO(Continue NPO per SLP Rec. Highly recommend to start EN feedings if pt's son decides to pursue option (pending). TF Rec:  Diabetic 1.5 (Glucerna 1.5) @ 50 ml/hr Cyclic x 23 hrs/d (30 min hold pre/post Synthroid) to provide 1150 ml TV, 1725 kcals, 95g P.)  Continued Inpatient Monitoring, Education not appropriate at this time    Nutrition Evaluation:   · Evaluation: Goals set   · Goals: Nutrition Progression.       · Monitoring: Nutrition Progression, Meal Intake, Supplement Intake, Diet Tolerance, Skin Integrity, I&O, Mental Status/Confusion, Weight, Pertinent Labs, Chewing/Swallowing, Monitor Bowel Function      Electronically signed by Bevelyn Lanes, RD, LD on 4/17/20 at 2:12 PM EDT    Contact Number: ext [phone]

## 2020-04-17 NOTE — PROGRESS NOTES
upper lobe, which may be   infectious. Neoplastic etiology cannot be excluded. Follow-up CT of   the chest recommended in 3 months. 4. Moderate to large hiatal hernia. Assessment:    Active Problems:    Acute respiratory failure with hypoxia (HCC)    COVID-19 virus infection    Hypernatremia    Dehydration    Hypophosphatemia    Dysphagia    Acidosis    Hypokalemia    Controlled type 2 diabetes mellitus without complication ([address]    Hyperlipidemia  Resolved Problems:    * No resolved hospital problems. *      Plan:  1. Acute respiratory failure with hypoxia wean oxygen as able  2. Hypokalemia monitor and replace prn  3. Pneumonia likely bacterial abx finished  4. Hypernatremia fluids per renal  5. Dehydration fluids per renal   will likely have to bolus fluids as needed. 6. htn monitor and tx.  7. Thrush continue mycelex  8. Hypophosphatemia monitor and replace prn  9. Acidosis monitor  10. Dysphagia d/w speech tx. Recommend npo  11. Dm type 2 controlled monitor bs and tx  12. Hyperlipidemia continue med     Palliative med following. Will need to clarify whether pt would want peg tube or not. Pt currently npo and, at this time, does not appear likely that he will regain function of some form of swallowing.      Electronically signed by [place], DO on 4/17/2020 at 4:14 PM

## 2020-04-17 NOTE — CARE COORDINATION
Spoke by phone with Dr. Melanie Rivero re; discharge planning. Recommendations for oral intake is NPO; direction of care to be addressed with rachel Shetty as to whether  A PEG tube is placed  Or consider pt for Hospice care. Await further input from Palliative care. In the meantime options are being investigated for [place] placement at facilities that are accepting COVID +pts([facility]). will follow and monitor pcp and palliative care input. requested that  re- rest for COVID in am (4/18) if pt remains asymptomatic. Seble Gary.

## 2020-04-18 NOTE — DISCHARGE SUMMARY
[facility] EMERGENCY SERVICE Physician Discharge Summary       Jose Miguel Arceo MD  [address]  [address] [facility],[address] [place] [address] Road [facility] [address]  [phone]          Activity level: as ron    Diet: Diet NPO Effective Now    Dispo:hospice house    Condition at discharge: guarded          Patient ID:  Michael Byrd  [id number]  44 y.o.  5/26/1932    Admit date: 3/30/2020    Discharge date and time:  4/18/2020  5:49 PM    Admission Diagnoses: Active Problems:    Acute respiratory failure with hypoxia (HCC)    COVID-19 virus infection    Hypernatremia    Dehydration    Hypophosphatemia    Dysphagia    Acidosis    Hypokalemia    Controlled type 2 diabetes mellitus without complication ([address]    Hyperlipidemia  Resolved Problems:    * No resolved hospital problems. *      Discharge Diagnoses: Active Problems:    Acute respiratory failure with hypoxia (HCC)    COVID-19 virus infection    Hypernatremia    Dehydration    Hypophosphatemia    Dysphagia    Acidosis    Hypokalemia    Controlled type 2 diabetes mellitus without complication ([address]    Hyperlipidemia  Resolved Problems:    * No resolved hospital problems.  *    Acute respiratory failure with hypoxia due to covid 19  Pneumonia due to covid 19 with likely bacterial superimposed(POA)  Hypernatremia  Dehydration  htn  Thrush  Hypophosphatemia  Acidosis  Dysphagia  Dm type 2 controlled  Hyperlipidemia      Consults:  IP CONSULT TO INFECTIOUS DISEASES  IP CONSULT TO SPIRITUAL SERVICES  IP CONSULT TO SOCIAL WORK  IP CONSULT TO IV TEAM  IP CONSULT TO IV TEAM  IP CONSULT TO IV TEAM  IP CONSULT TO IV TEAM  IP CONSULT TO IV TEAM  IP CONSULT TO IV TEAM  IP CONSULT TO NEPHROLOGY  IP CONSULT TO DIETITIAN  IP CONSULT TO PALLIATIVE CARE  IP CONSULT TO HOSPICE    Procedures: none    Hospital Course: Patient was admitted with Acute respiratory failure with hypoxia ([address] [J96.01]  Acute respiratory failure with hypoxia ([facility] [J96.01]. Patient is an 81 yo male patient who follows with PCP Dr. Reyes Jackson with a past medical history of chronic respiratory failure with hypoxia- on 2 L NC, DM, HTN, CVA, arthritis. Patient presents from home by EMS for complaints of cough, sob, hypoxia. Please note that patient is a poor historian. He does not know why he came to the hospital. Reporting he has not been feeling well for 4-5 days. Reporting pain in left chest area- bruise noted. Reporting he does have 02 at home- but does not normally wear it. He is unsure if he follows with a pulmonologist. Symptoms moderate, ongoing, and progressive. Nothing makes better or worse. Wife is admitted on 6W for rule out. Patient awake, alert, resting in bed in no acute distress. Denies sob, chest pain, fevers, chills, nausea, vomiting, dysuria. Pt seen and examined by consultants. Pt had initial test false negative for covid. Pt had been given abx including plaquenil. Pt placed on oxygen. Pt had extended hospital course. Overall, pt continued to decline despite treatment. Swallowing assessed and pt not able to swallow without concern for aspiration. Discussion with family about pt's condition along with risks/benefits of further treatment vs comfort measures. Pt would not want feeding tube and would want hospice. On day of discharge, pt denied fevers, chills,n/v. Pt c/o some sob. Pt discharged to hospice house. Discharge Exam:  Vitals:    04/18/20 1015 04/18/20 1017 04/18/20 1145 04/18/20 1649   BP: (!) 160/75 (!) 151/79     Pulse: 75 78     Resp:   20    Temp:       TempSrc:       SpO2:   92% 93%   Weight:       Height:           Due to the COVID-19 status, to reduce exposure and contamination and saving on PPE, I evaluated the patient through the chart, nursing assessment, other physicians' assessment and exams as well as telemedicine.  As I was standing out of the

## 2020-04-18 NOTE — CARE COORDINATION
Social Work / Discharge Planning :SW updated by patient RN consult for Hospice. Son Ambrocio Benitez is the contact. Patient spouse passed away in ICU and patient not aware. Patient daughter is also a patient on same unit as the patient as well and not aware. Ambrocio Benitez is the only contact at this time. This SW called [facility] and discussed Hospice. He would like [facility] and stated they knew someone that was at [facility]. SW called and spoke to Ata from [place] and she will contact Janet Davis the [facility]. Dr Isaias Salomon updated. Much support provided to the family. SW to follow .  Electronically signed by MERRILL West on 4/18/2020 at 1:48 PM

## 2020-04-18 NOTE — PROGRESS NOTES
Palliative medicine    I spoke with the patient's son David Hurtado and his wife over the phone. I explained that patient's dietary recommended durations currently are strict NPO due to his aspiration risk so the question no longer becomes how much the patient will eat but how he will be getting fed. I advised at this point will have to put an NG tube down to provide tube feeds for the patient in the interim until he is able to get a PEG tube versus pursuing pleasure feeds and comfort measures. Sadly patient's wife and Yuri's mom is currently doing very poorly in the critical care unit. I did discuss this information however he will speak with the rest of his family and attempt to make a decision by tomorrow. Encouraged him to write down any additional questions or needs they have that arise during conversation in regards to the decision. They did ask for guidance in making the decision I advised them to think about how the patient would and would want to live in the quality of life he would expect. This and extraordinarily difficult time for them making these decisions with his mother and father both hospitalized as well as his sister. I provided empathy and offered support.     Toma Bhakta PA-C

## 2020-11-25 NOTE — PROGRESS NOTES
Adderall 20 mg last filled 10/22/20 for #60, 0 refills.  Last seen 3/10/20 for ADHD  Next scheduled - none  Please advise.   Balance Sitting: fair/fair minus sitting balance   Sitting EOB x4-5 min      Standing: poor Sitting: Fair  (at EOB)  Standing: Poor+ to [address]-  (with walker)      Activity Tolerance Poor  4L O2 throughout session Poor+ standing ron x3-4 min with fair minus balance during self care tasks   B UE  R UE: proximally: 1/5  Distally: 2-/5     L UE: proximally: 2-/5  Distally: 3-/5 B UEs  Proximally 2-/5 grossly  Distally 3/5 grossly   B UE: 3+/5     Comments: Upon this OTR's arrival to patient's room, patient supine in bed. Upon conclusion of this session, patient supine in bed with all lines and tubes intact, bed alarm activated, and call light within reach. Education: Patient education provided regardin) safe transfer techniques. Patient demonstrated limited understanding. · Patient has made progress towards set goals. · Continue with current plan of care. Treatment Time In: 1500  Treatment Time Out: 1525    Treatment Charges: Minutes: Units: Ther Ex  [id number]     Manual Therapy Melita Crane [id number] 10 1   ADL/Home Mgt [id number] 15 1   Neuro Re-ed [id number]     Group Therapy      Orthotic manage/training  [id number]     Total Timed Treatment 25 2     LUCAS Jensen/L  License Number: [id number]